# Patient Record
Sex: MALE | Race: WHITE | NOT HISPANIC OR LATINO | Employment: OTHER | URBAN - METROPOLITAN AREA
[De-identification: names, ages, dates, MRNs, and addresses within clinical notes are randomized per-mention and may not be internally consistent; named-entity substitution may affect disease eponyms.]

---

## 2017-01-17 ENCOUNTER — OFFICE VISIT (OUTPATIENT)
Dept: LAB | Facility: CLINIC | Age: 72
End: 2017-01-17
Payer: MEDICARE

## 2017-01-17 ENCOUNTER — APPOINTMENT (OUTPATIENT)
Dept: LAB | Facility: CLINIC | Age: 72
End: 2017-01-17
Payer: MEDICARE

## 2017-01-17 ENCOUNTER — TRANSCRIBE ORDERS (OUTPATIENT)
Dept: LAB | Facility: CLINIC | Age: 72
End: 2017-01-17

## 2017-01-17 DIAGNOSIS — J33.9 NASAL POLYPS: ICD-10-CM

## 2017-01-17 DIAGNOSIS — J33.9 NASAL POLYPS: Primary | ICD-10-CM

## 2017-01-17 LAB
ANION GAP SERPL CALCULATED.3IONS-SCNC: 9 MMOL/L (ref 4–13)
ATRIAL RATE: 58 BPM
ATRIAL RATE: 61 BPM
BASOPHILS # BLD AUTO: 0.03 THOUSANDS/ΜL (ref 0–0.1)
BASOPHILS NFR BLD AUTO: 0 % (ref 0–1)
BUN SERPL-MCNC: 21 MG/DL (ref 5–25)
CALCIUM SERPL-MCNC: 8.7 MG/DL (ref 8.3–10.1)
CHLORIDE SERPL-SCNC: 103 MMOL/L (ref 100–108)
CO2 SERPL-SCNC: 29 MMOL/L (ref 21–32)
CREAT SERPL-MCNC: 0.83 MG/DL (ref 0.6–1.3)
EOSINOPHIL # BLD AUTO: 0.04 THOUSAND/ΜL (ref 0–0.61)
EOSINOPHIL NFR BLD AUTO: 0 % (ref 0–6)
ERYTHROCYTE [DISTWIDTH] IN BLOOD BY AUTOMATED COUNT: 14.1 % (ref 11.6–15.1)
GFR SERPL CREATININE-BSD FRML MDRD: >60 ML/MIN/1.73SQ M
GLUCOSE SERPL-MCNC: 83 MG/DL (ref 65–140)
HCT VFR BLD AUTO: 44.9 % (ref 36.5–49.3)
HGB BLD-MCNC: 14.8 G/DL (ref 12–17)
LYMPHOCYTES # BLD AUTO: 2.78 THOUSANDS/ΜL (ref 0.6–4.47)
LYMPHOCYTES NFR BLD AUTO: 26 % (ref 14–44)
MCH RBC QN AUTO: 29.9 PG (ref 26.8–34.3)
MCHC RBC AUTO-ENTMCNC: 33 G/DL (ref 31.4–37.4)
MCV RBC AUTO: 91 FL (ref 82–98)
MONOCYTES # BLD AUTO: 1.13 THOUSAND/ΜL (ref 0.17–1.22)
MONOCYTES NFR BLD AUTO: 10 % (ref 4–12)
NEUTROPHILS # BLD AUTO: 6.88 THOUSANDS/ΜL (ref 1.85–7.62)
NEUTS SEG NFR BLD AUTO: 64 % (ref 43–75)
P AXIS: 56 DEGREES
PLATELET # BLD AUTO: 242 THOUSANDS/UL (ref 149–390)
PMV BLD AUTO: 10.2 FL (ref 8.9–12.7)
POTASSIUM SERPL-SCNC: 4.1 MMOL/L (ref 3.5–5.3)
PR INTERVAL: 198 MS
QRS AXIS: -45 DEGREES
QRS AXIS: -46 DEGREES
QRSD INTERVAL: 106 MS
QRSD INTERVAL: 94 MS
QT INTERVAL: 424 MS
QT INTERVAL: 436 MS
QTC INTERVAL: 416 MS
QTC INTERVAL: 438 MS
RBC # BLD AUTO: 4.95 MILLION/UL (ref 3.88–5.62)
SODIUM SERPL-SCNC: 141 MMOL/L (ref 136–145)
T WAVE AXIS: 48 DEGREES
T WAVE AXIS: 70 DEGREES
VENTRICULAR RATE: 58 BPM
VENTRICULAR RATE: 61 BPM
WBC # BLD AUTO: 10.86 THOUSAND/UL (ref 4.31–10.16)

## 2017-01-17 PROCEDURE — 80048 BASIC METABOLIC PNL TOTAL CA: CPT

## 2017-01-17 PROCEDURE — 85025 COMPLETE CBC W/AUTO DIFF WBC: CPT

## 2017-01-17 PROCEDURE — 36415 COLL VENOUS BLD VENIPUNCTURE: CPT

## 2017-01-17 PROCEDURE — 93005 ELECTROCARDIOGRAM TRACING: CPT

## 2018-10-09 ENCOUNTER — OFFICE VISIT (OUTPATIENT)
Dept: UROLOGY | Facility: CLINIC | Age: 73
End: 2018-10-09
Payer: MEDICARE

## 2018-10-09 VITALS
SYSTOLIC BLOOD PRESSURE: 136 MMHG | WEIGHT: 259 LBS | BODY MASS INDEX: 37.08 KG/M2 | DIASTOLIC BLOOD PRESSURE: 88 MMHG | HEIGHT: 70 IN | HEART RATE: 62 BPM

## 2018-10-09 DIAGNOSIS — N40.1 BENIGN PROSTATIC HYPERPLASIA WITH LOWER URINARY TRACT SYMPTOMS, SYMPTOM DETAILS UNSPECIFIED: Primary | ICD-10-CM

## 2018-10-09 LAB
SL AMB  POCT GLUCOSE, UA: NORMAL
SL AMB LEUKOCYTE ESTERASE,UA: NORMAL
SL AMB POCT BILIRUBIN,UA: NORMAL
SL AMB POCT BLOOD,UA: NORMAL
SL AMB POCT CLARITY,UA: CLEAR
SL AMB POCT COLOR,UA: YELLOW
SL AMB POCT KETONES,UA: NORMAL
SL AMB POCT NITRITE,UA: NORMAL
SL AMB POCT PH,UA: 6
SL AMB POCT SPECIFIC GRAVITY,UA: 1.01
SL AMB POCT URINE PROTEIN: NORMAL
SL AMB POCT UROBILINOGEN: NORMAL

## 2018-10-09 PROCEDURE — 99213 OFFICE O/P EST LOW 20 MIN: CPT | Performed by: PHYSICIAN ASSISTANT

## 2018-10-09 PROCEDURE — 81002 URINALYSIS NONAUTO W/O SCOPE: CPT | Performed by: PHYSICIAN ASSISTANT

## 2018-10-09 RX ORDER — METOPROLOL SUCCINATE 50 MG/1
TABLET, EXTENDED RELEASE ORAL
Refills: 3 | COMMUNITY
Start: 2018-09-14

## 2018-10-09 RX ORDER — WARFARIN SODIUM 7.5 MG/1
7.5 TABLET ORAL
COMMUNITY

## 2018-10-09 RX ORDER — MONTELUKAST SODIUM 10 MG/1
TABLET ORAL
Refills: 3 | COMMUNITY
Start: 2018-08-08

## 2018-10-09 NOTE — PROGRESS NOTES
UROLOGY PROGRESS NOTE   Patient Identifiers: Lia Freed (MRN [id number])  Date of Service: 10/9/2018    Subjective:      69-year-old male history of BPH and abnormal digital rectal exam   He had a prostate biopsy in 2012 which was benign  He also had a TURP in 2014 which was also benign  PSA is currently 2 0 and stable Urine is clear  He denies any lower tract complaints  Patient has  no complaints  Objective:     VITALS:    Vitals:    10/09/18 1038   BP: 136/88   Pulse: 62           LABS:  Lab Results   Component Value Date    HGB 14 8 01/17/2017    HCT 44 9 01/17/2017    WBC 10 86 (H) 01/17/2017     01/17/2017   ]    Lab Results   Component Value Date     01/17/2017    K 4 1 01/17/2017     01/17/2017    CO2 29 01/17/2017    BUN 21 01/17/2017    CREATININE 0 83 01/17/2017    CALCIUM 8 7 01/17/2017    GLUCOSE 129 12/12/2014   ]        INPATIENT MEDS:    Current Outpatient Prescriptions:     metoprolol succinate (TOPROL-XL) 50 mg 24 hr tablet, TK 1 T PO QD, Disp: , Rfl: 3    montelukast (SINGULAIR) 10 mg tablet, TK ONE T PO QPM, Disp: , Rfl: 3    warfarin (COUMADIN) 7 5 mg tablet, Take 7 5 mg by mouth, Disp: , Rfl:       Physical Exam:   /88 (Patient Position: Sitting, Cuff Size: Adult)   Pulse 62   Ht 5' 9 5" (1 765 m)   Wt 117 kg (259 lb)   BMI 37 70 kg/m²   GEN: no acute distress    RESP: breathing comfortably with no accessory muscle use    ABD: soft, non-tender, non-distended   INCISION:    EXT: no significant peripheral edema   (Male): Penis circumcised, phallus normal, meatus patent  Testicles descended into scrotum bilaterally without masses nor tenderness  No inguinal hernias bilaterally  MIKE: Prostate is enlarged at 40 grams  Abnormal on the right side and stable  The prostate is not boggy  The prostate is not tender  No nodules noted      RADIOLOGY:     None     Assessment:    1   BPH     Plan:   - follow-up in 1 year with PSA prior to visit  -  -  -

## 2019-03-11 PROBLEM — R43.8 HYPOSMIA: Status: ACTIVE | Noted: 2019-03-11

## 2019-03-11 PROBLEM — H90.3 SENSORINEURAL HEARING LOSS (SNHL), BILATERAL: Status: ACTIVE | Noted: 2019-03-11

## 2019-03-11 PROBLEM — J33.9 NASAL POLYPOSIS: Status: ACTIVE | Noted: 2019-03-11

## 2019-10-16 ENCOUNTER — OFFICE VISIT (OUTPATIENT)
Dept: UROLOGY | Facility: CLINIC | Age: 74
End: 2019-10-16
Payer: MEDICARE

## 2019-10-16 VITALS
DIASTOLIC BLOOD PRESSURE: 82 MMHG | WEIGHT: 253 LBS | SYSTOLIC BLOOD PRESSURE: 132 MMHG | HEIGHT: 72 IN | BODY MASS INDEX: 34.27 KG/M2 | HEART RATE: 85 BPM

## 2019-10-16 DIAGNOSIS — N40.1 BPH WITH OBSTRUCTION/LOWER URINARY TRACT SYMPTOMS: Primary | ICD-10-CM

## 2019-10-16 DIAGNOSIS — N13.8 BPH WITH OBSTRUCTION/LOWER URINARY TRACT SYMPTOMS: Primary | ICD-10-CM

## 2019-10-16 PROCEDURE — 99213 OFFICE O/P EST LOW 20 MIN: CPT | Performed by: PHYSICIAN ASSISTANT

## 2019-10-16 NOTE — PROGRESS NOTES
UROLOGY PROGRESS NOTE   Patient Identifiers: Gal Greenwood (MRN [id number])  Date of Service: 10/16/2019    Subjective:     67-year-old man history of BPH and abnormal MIKE  He had a prostate biopsy in 2012 which was benign and a TURP in 2014 which was also benign  His current PSA is 2 06  He has AUA index of 10 with no other complaints  Patient   Is here for follow-up of BPH    Objective:     VITALS:    Vitals:    10/16/19 1436   BP: 132/82   Pulse: 85     AUA SYMPTOM SCORE      Most Recent Value   AUA SYMPTOM SCORE   How often have you had a sensation of not emptying your bladder completely after you finished urinating? 1   How often have you had to urinate again less than two hours after you finished urinating? 2   How often have you found you stopped and started again several times when you urinate? 2   How often have you found it difficult to postpone urination? 1   How often have you had a weak urinary stream?  1   How often have you had to push or strain to begin urination? 2   How many times did you most typically get up to urinate from the time you went to bed at night until the time you got up in the morning?   1   Quality of Life: If you were to spend the rest of your life with your urinary condition just the way it is now, how would you feel about that?  2   AUA SYMPTOM SCORE  10            LABS:  Lab Results   Component Value Date    HGB 14 8 01/17/2017    HCT 44 9 01/17/2017    WBC 10 86 (H) 01/17/2017     01/17/2017   ]    Lab Results   Component Value Date     12/12/2014    K 4 1 01/17/2017     01/17/2017    CO2 29 01/17/2017    BUN 21 01/17/2017    CREATININE 0 83 01/17/2017    CALCIUM 8 7 01/17/2017    GLUCOSE 129 12/12/2014   ]        INPATIENT MEDS:    Current Outpatient Medications:     metoprolol succinate (TOPROL-XL) 50 mg 24 hr tablet, TK 1 T PO QD, Disp: , Rfl: 3    montelukast (SINGULAIR) 10 mg tablet, TK ONE T PO QPM, Disp: , Rfl: 3    warfarin (COUMADIN) 7 5 mg tablet, Take 7 5 mg by mouth, Disp: , Rfl:       Physical Exam:   /82 (BP Location: Left arm, Patient Position: Sitting, Cuff Size: Adult)   Pulse 85   Ht 6' (1 829 m)   Wt 115 kg (253 lb)   BMI 34 31 kg/m²   GEN: no acute distress    RESP: breathing comfortably with no accessory muscle use    ABD: soft, non-tender, non-distended   INCISION:    EXT: no significant peripheral edema   (Male): Penis circumcised, phallus normal, meatus patent  Testicles descended into scrotum bilaterally without masses nor tenderness  No inguinal hernias bilaterally  MIKE: Prostate is enlarged at 45 grams  The prostate is not boggy  The prostate is not tender  abnormal on the right side    RADIOLOGY:    none     Assessment:    1   BPH     Plan:   - follow-up in 1 year with PSA prior to visit  -  -  -

## 2020-03-10 PROBLEM — H69.83 ETD (EUSTACHIAN TUBE DYSFUNCTION), BILATERAL: Status: ACTIVE | Noted: 2020-03-10

## 2020-03-10 PROBLEM — H69.93 ETD (EUSTACHIAN TUBE DYSFUNCTION), BILATERAL: Status: ACTIVE | Noted: 2020-03-10

## 2020-03-10 PROBLEM — H65.93 RECURRENT SEROUS OTITIS MEDIA, BILATERAL: Status: ACTIVE | Noted: 2020-03-10

## 2021-02-09 ENCOUNTER — OFFICE VISIT (OUTPATIENT)
Dept: UROLOGY | Facility: CLINIC | Age: 76
End: 2021-02-09
Payer: MEDICARE

## 2021-02-09 VITALS
WEIGHT: 260 LBS | BODY MASS INDEX: 35.21 KG/M2 | HEART RATE: 72 BPM | HEIGHT: 72 IN | SYSTOLIC BLOOD PRESSURE: 150 MMHG | DIASTOLIC BLOOD PRESSURE: 84 MMHG

## 2021-02-09 DIAGNOSIS — N13.8 BPH WITH OBSTRUCTION/LOWER URINARY TRACT SYMPTOMS: Primary | ICD-10-CM

## 2021-02-09 DIAGNOSIS — N40.1 BPH WITH OBSTRUCTION/LOWER URINARY TRACT SYMPTOMS: Primary | ICD-10-CM

## 2021-02-09 PROCEDURE — 99213 OFFICE O/P EST LOW 20 MIN: CPT | Performed by: PHYSICIAN ASSISTANT

## 2021-02-09 NOTE — PROGRESS NOTES
UROLOGY PROGRESS NOTE   Patient Identifiers: Delia Davis (MRN [id number])  Date of Service: 2/9/2021    Subjective:     67-year-old man history of BPH and abnormal digital rectal exam   He had a prostate biopsy in 2012 which was benign and a TURP in 2014  PSA is 2 27  He has no specific outlet complaints  Reason for visit:  BPH and abnormal MIKE follow-up    Objective:     VITALS:    There were no vitals filed for this visit  LABS:  Lab Results   Component Value Date    HGB 14 8 01/17/2017    HCT 44 9 01/17/2017    WBC 10 86 (H) 01/17/2017     01/17/2017   ]    Lab Results   Component Value Date     12/12/2014    K 4 1 01/17/2017     01/17/2017    CO2 29 01/17/2017    BUN 21 01/17/2017    CREATININE 0 83 01/17/2017    CALCIUM 8 7 01/17/2017    GLUCOSE 129 12/12/2014   ]        INPATIENT MEDS:    Current Outpatient Medications:     cefdinir (OMNICEF) 300 mg capsule, TK 1 C PO Q 12 H, Disp: , Rfl:     doxycycline (ADOXA) 100 MG tablet, TK 1 T PO  D, Disp: , Rfl:     metoprolol succinate (TOPROL-XL) 50 mg 24 hr tablet, TK 1 T PO QD, Disp: , Rfl: 3    montelukast (SINGULAIR) 10 mg tablet, TK ONE T PO QPM, Disp: , Rfl: 3    warfarin (COUMADIN) 7 5 mg tablet, Take 7 5 mg by mouth, Disp: , Rfl:       Physical Exam:   There were no vitals taken for this visit  GEN: no acute distress    RESP: breathing comfortably with no accessory muscle use    ABD: soft, non-tender, non-distended   INCISION:    EXT: no significant peripheral edema   (Male): Penis circumcised, phallus normal, meatus patent  Testicles descended into scrotum bilaterally without masses nor tenderness  No inguinal hernias bilaterally  MIKE: Prostate is enlarged at 35 grams  The prostate is not boggy  The prostate is not tender  No nodules noted      RADIOLOGY:    none     Assessment:    1    BPH    Plan:   - follow-up in 1 year with PSA prior to visit  -  -  -

## 2021-05-18 ENCOUNTER — TELEPHONE (OUTPATIENT)
Dept: UROLOGY | Facility: MEDICAL CENTER | Age: 76
End: 2021-05-18

## 2021-05-18 DIAGNOSIS — N50.82 SCROTAL PAIN: Primary | ICD-10-CM

## 2021-05-18 NOTE — TELEPHONE ENCOUNTER
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

## 2021-05-18 NOTE — TELEPHONE ENCOUNTER
Patient at the [facility] office  Patient known for BPH with LUTS  Patient last office visit 2/9/2021      Patient calling in due to right testicle swelling  Patient having pain 9/10  Attempted to call patient at this time   Wife states patient is out at this time     She states she will have him call us back to triage his symptoms when he gets home     Provided her with office number

## 2021-05-19 NOTE — TELEPHONE ENCOUNTER
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

## 2021-05-19 NOTE — TELEPHONE ENCOUNTER
Patient called and advised that he still is having swelling but the pain level went down to 3 out of 10  Patient advised that he can be reached at [phone]  Please advise

## 2021-05-20 NOTE — TELEPHONE ENCOUNTER
Would recommend that patient have a ultrasound of scrotum and testicles within 1 week and have outpatient follow-up   Shortly after  Please call patient and let him know that I have sent in order for ultrasound  As well as to schedule a follow-up appointment  Will place urine testing as well     would recommend scrotal elevation  Ibuprofen and Tylenol for pain

## 2021-05-20 NOTE — TELEPHONE ENCOUNTER
Called and spoke with patient at this time  Advised providers recommend urine testing to rule out infection as well as scrotal US to be done within 1 week  Patient agreeable to plan  Number for CS provided, he will call to schedule US  Reviewed urine testing is a walk in procedure  Patient states he goes to [facility] in [place]  Advised I will fax urine testing orders to them  He will go in the next few days for this  Reviewed we would then like to see him in the office for follow up and to review [address] [facility]  Appt scheduled for 6/4/21  He knows US needs to be done before this appt  In the meantime advised supportive underwear/pants, elevation of scrotum when sitting/resting, tylenol as needed  He was thankful for call back and verbalized understanding  Urine testing orders faxed to [address] [place] [address], [phone]

## 2021-05-21 NOTE — TELEPHONE ENCOUNTER
Patient's wife called to provide a different fax number [phone]  Resent labs to that fax and confirmed

## 2021-05-23 ENCOUNTER — HOSPITAL ENCOUNTER (OUTPATIENT)
Dept: ULTRASOUND IMAGING | Facility: HOSPITAL | Age: 76
Discharge: HOME | End: 2021-05-23
Payer: MEDICARE

## 2021-05-23 DIAGNOSIS — N50.82 SCROTAL PAIN: ICD-10-CM

## 2021-05-23 PROCEDURE — 76870 US EXAM SCROTUM: CPT

## 2021-06-03 NOTE — PROGRESS NOTES
6/4/2021      Chief Complaint   Patient presents with    Benign Prostatic Hypertrophy     Assessment and Plan    1  Right testicular pain and swelling  - Resolved  - US scrotum and testicles from 5/23/21 reveals mildly complex small to moderate-sized right hydrocele and left varicocele noting 2 adjacent simple cysts measuring up to 2 4 cm   - Recommend scrotal elevation and NSAIDS for any recurrent discomfort    - No further intervention required at this time given resolution of symptoms  2  BPH  - S/p TURP in 2014  - Symptoms well managed at this time  - Return as scheduled next February for yearly follow-up     History of Present Illness  Lolis Vargas is a 77 y o  male here for evaluation of new right testicular pain and enlargement  States this resolved and currently denies any pain or swelling  US scrotum and testicles from 5/23/21 reveals mildly complex small to moderate-sized right hydrocele and left varicocele noting 2 adjacent simple cysts measuring up to 2 4 cm  Patient is a farmer and uses tractor often  Denies any urinary complaints  Review of Systems   Constitutional: Negative for chills and fever  Respiratory: Negative for shortness of breath  Cardiovascular: Negative for chest pain  Gastrointestinal: Negative for abdominal pain, constipation, diarrhea, nausea and vomiting  Genitourinary: Negative for difficulty urinating, discharge, dysuria, flank pain, frequency, genital sores, hematuria, penile pain, penile swelling, scrotal swelling, testicular pain and urgency  Skin: Negative for color change and rash  Neurological: Negative for dizziness                    Past Medical History  Past Medical History:   Diagnosis Date    Arthritis     Hypertension     Sexual dysfunction        Past Social History  Past Surgical History:   Procedure Laterality Date    HIP ARTHROPLASTY Left 2004    NASAL POLYP EXCISION      2008 & 2017    REPLACEMENT TOTAL KNEE Right     REVISION TOTAL HIP ARTHROPLASTY Left 2006    TRANSURETHRAL RESECTION OF PROSTATE  12/11/2014     Social History     Tobacco Use   Smoking Status Former Smoker   Smokeless Tobacco Never Used       Past Family History  Family History   Problem Relation Age of Onset    Diabetes Mother     Heart disease Mother     Hypertension Mother     Heart disease Father     Hypertension Father     Stroke Father        Past Social history  Social History     Socioeconomic History    Marital status: /Civil Union     Spouse name: Not on file    Number of children: Not on file    Years of education: Not on file    Highest education level: Not on file   Occupational History    Not on file   Social Needs    Financial resource strain: Not on file    Food insecurity     Worry: Not on file     Inability: Not on file   [employer] needs     Medical: Not on file     Non-medical: Not on file   Tobacco Use    Smoking status: Former Smoker    Smokeless tobacco: Never Used   Substance and Sexual Activity    Alcohol use: Yes     Frequency: Monthly or less    Drug use: No    Sexual activity: Not on file   Lifestyle    Physical activity     Days per week: Not on file     Minutes per session: Not on file    Stress: Not on file   Relationships    Social connections     Talks on phone: Not on file     Gets together: Not on file     Attends Hoahaoism service: Not on file     Active member of club or organization: Not on file     Attends meetings of clubs or organizations: Not on file     Relationship status: Not on file    Intimate partner violence     Fear of current or ex partner: Not on file     Emotionally abused: Not on file     Physically abused: Not on file     Forced sexual activity: Not on file   Other Topics Concern    Not on file   Social History Narrative    Not on file       Current Medications  Current Outpatient Medications   Medication Sig Dispense Refill    cefdinir (OMNICEF) 300 mg capsule TK 1 C PO Q 12 H      doxycycline (ADOXA) 100 MG tablet TK 1 T PO  D      metoprolol succinate (TOPROL-XL) 50 mg 24 hr tablet TK 1 T PO QD  3    montelukast (SINGULAIR) 10 mg tablet TK ONE T PO QPM  3    warfarin (COUMADIN) 7 5 mg tablet Take 7 5 mg by mouth       No current facility-administered medications for this visit  Allergies  No Known Allergies      The following portions of the patient's history were reviewed and updated as appropriate: allergies, current medications, past medical history, past social history, past surgical history and problem list       Vitals  Vitals:    06/04/21 0828   BP: 150/100   Pulse: 62   Weight: 113 kg (248 lb 9 6 oz)   Height: 6' (1 829 m)           Physical Exam  Physical Exam  Constitutional:       Appearance: Normal appearance  HENT:      Head: Normocephalic and atraumatic  Right Ear: External ear normal       Left Ear: External ear normal    Eyes:      General: No scleral icterus  Conjunctiva/sclera: Conjunctivae normal    Neck:      Musculoskeletal: Normal range of motion  Cardiovascular:      Pulses: Normal pulses  Pulmonary:      Effort: Pulmonary effort is normal    Genitourinary:     Comments: No scrotal erythema, lesions, or rashes  Right testicle slightly enlarged and consistent with hydrocele  No palpable tenderness  Musculoskeletal: Normal range of motion  Skin:     General: Skin is warm and dry  Findings: No erythema, lesion or rash  Neurological:      General: No focal deficit present  Mental Status: He is alert and oriented to person, place, and time  Psychiatric:         Mood and Affect: Mood normal          Behavior: Behavior normal          Thought Content:  Thought content normal          Judgment: Judgment normal            Results  Recent Results (from the past 1 hour(s))   POCT Measure PVR    Collection Time: 06/04/21  8:31 AM   Result Value Ref Range    POST-VOID RESIDUAL VOLUME, ML  mL   ]  No results found for: PSA  Lab Results   Component Value Date    GLUCOSE 129 12/12/2014    CALCIUM 8 7 01/17/2017     12/12/2014    K 4 1 01/17/2017    CO2 29 01/17/2017     01/17/2017    BUN 21 01/17/2017    CREATININE 0 83 01/17/2017     Lab Results   Component Value Date    WBC 10 86 (H) 01/17/2017    HGB 14 8 01/17/2017    HCT 44 9 01/17/2017    MCV 91 01/17/2017     01/17/2017           Orders  Orders Placed This Encounter   Procedures    POCT Measure PVR       Celestino Chua PA-C

## 2021-06-04 ENCOUNTER — OFFICE VISIT (OUTPATIENT)
Dept: UROLOGY | Facility: CLINIC | Age: 76
End: 2021-06-04
Payer: MEDICARE

## 2021-06-04 VITALS
SYSTOLIC BLOOD PRESSURE: 150 MMHG | HEIGHT: 72 IN | BODY MASS INDEX: 33.67 KG/M2 | HEART RATE: 62 BPM | WEIGHT: 248.6 LBS | DIASTOLIC BLOOD PRESSURE: 100 MMHG

## 2021-06-04 DIAGNOSIS — N50.819 PAIN IN TESTICLE, UNSPECIFIED LATERALITY: Primary | ICD-10-CM

## 2021-06-04 LAB — POST-VOID RESIDUAL VOLUME, ML POC: 174 ML

## 2021-06-04 PROCEDURE — 51798 US URINE CAPACITY MEASURE: CPT | Performed by: PHYSICIAN ASSISTANT

## 2021-06-04 PROCEDURE — 99213 OFFICE O/P EST LOW 20 MIN: CPT | Performed by: PHYSICIAN ASSISTANT

## 2022-02-07 ENCOUNTER — TELEPHONE (OUTPATIENT)
Dept: UROLOGY | Facility: MEDICAL CENTER | Age: 77
End: 2022-02-07

## 2022-02-07 NOTE — TELEPHONE ENCOUNTER
Spoke to Cristiane Patel   He is going to get lab work completed on 2/8/22 for his appointment on 2/10/22

## 2022-02-10 ENCOUNTER — OFFICE VISIT (OUTPATIENT)
Dept: UROLOGY | Facility: CLINIC | Age: 77
End: 2022-02-10
Payer: COMMERCIAL

## 2022-02-10 VITALS
HEIGHT: 72 IN | HEART RATE: 77 BPM | SYSTOLIC BLOOD PRESSURE: 132 MMHG | BODY MASS INDEX: 35.21 KG/M2 | DIASTOLIC BLOOD PRESSURE: 88 MMHG | WEIGHT: 260 LBS

## 2022-02-10 DIAGNOSIS — N13.8 BPH WITH OBSTRUCTION/LOWER URINARY TRACT SYMPTOMS: Primary | ICD-10-CM

## 2022-02-10 DIAGNOSIS — N40.1 BPH WITH OBSTRUCTION/LOWER URINARY TRACT SYMPTOMS: Primary | ICD-10-CM

## 2022-02-10 PROCEDURE — 99213 OFFICE O/P EST LOW 20 MIN: CPT | Performed by: PHYSICIAN ASSISTANT

## 2022-02-10 NOTE — PROGRESS NOTES
UROLOGY PROGRESS NOTE   Patient Identifiers: Mily Diego (MRN [id number])  Date of Service: 2/10/2022    Subjective:      76-year-old man history of BPH with abnormal digital rectal exam   He had a prostate biopsy in 2012 which was benign  TURP in 2014 which was also  Current PSA 1 99 peak no specific outlet complaints  Reason for visit:   BPH follow-up    Objective:     VITALS:    Vitals:    02/10/22 1022   BP: 132/88   Pulse: 77           LABS:  Lab Results   Component Value Date    HGB 14 8 01/17/2017    HCT 44 9 01/17/2017    WBC 10 86 (H) 01/17/2017     01/17/2017   ]    Lab Results   Component Value Date     12/12/2014    K 4 1 01/17/2017     01/17/2017    CO2 29 01/17/2017    BUN 21 01/17/2017    CREATININE 0 83 01/17/2017    CALCIUM 8 7 01/17/2017    GLUCOSE 129 12/12/2014   ]        INPATIENT MEDS:    Current Outpatient Medications:     metoprolol succinate (TOPROL-XL) 50 mg 24 hr tablet, TK 1 T PO QD, Disp: , Rfl: 3    warfarin (COUMADIN) 7 5 mg tablet, Take 7 5 mg by mouth, Disp: , Rfl:     cefdinir (OMNICEF) 300 mg capsule, TK 1 C PO Q 12 H (Patient not taking: Reported on 2/10/2022), Disp: , Rfl:     doxycycline (ADOXA) 100 MG tablet, TK 1 T PO  D (Patient not taking: Reported on 2/10/2022), Disp: , Rfl:     montelukast (SINGULAIR) 10 mg tablet, TK ONE T PO QPM (Patient not taking: Reported on 2/10/2022), Disp: , Rfl: 3      Physical Exam:   /88 (BP Location: Left arm, Patient Position: Sitting, Cuff Size: Adult)   Pulse 77   Ht 6' (1 829 m)   Wt 118 kg (260 lb)   BMI 35 26 kg/m²   GEN: no acute distress    RESP: breathing comfortably with no accessory muscle use    ABD: soft, non-tender, non-distended   INCISION:    EXT: no significant peripheral edema   (Male): Penis circumcised, phallus normal, meatus patent  Testicles descended into scrotum bilaterally without masses nor tenderness  No inguinal hernias bilaterally    MIKE: Prostate is enlarged at 45 grams  The prostate is not boggy  The prostate is not tender  No nodules noted  Asymmetrical right greater than left  RADIOLOGY:     None     Assessment:    1   BPH     Plan:   - follow-up in 1 year with PSA prior to visit  -  -  -

## 2022-04-14 PROBLEM — H90.A32 MIXED CONDUCTIVE AND SENSORINEURAL HEARING LOSS OF LEFT EAR WITH RESTRICTED HEARING OF RIGHT EAR: Status: ACTIVE | Noted: 2022-04-14

## 2022-04-14 PROBLEM — H90.A21 SENSORINEURAL HEARING LOSS (SNHL) OF RIGHT EAR WITH RESTRICTED HEARING OF LEFT EAR: Status: ACTIVE | Noted: 2019-03-11

## 2023-02-13 ENCOUNTER — OFFICE VISIT (OUTPATIENT)
Dept: UROLOGY | Facility: CLINIC | Age: 78
End: 2023-02-13

## 2023-02-13 ENCOUNTER — TELEPHONE (OUTPATIENT)
Dept: UROLOGY | Facility: CLINIC | Age: 78
End: 2023-02-13

## 2023-02-13 VITALS
WEIGHT: 264 LBS | HEIGHT: 72 IN | DIASTOLIC BLOOD PRESSURE: 88 MMHG | SYSTOLIC BLOOD PRESSURE: 138 MMHG | BODY MASS INDEX: 35.76 KG/M2

## 2023-02-13 DIAGNOSIS — N13.8 BPH WITH OBSTRUCTION/LOWER URINARY TRACT SYMPTOMS: Primary | ICD-10-CM

## 2023-02-13 DIAGNOSIS — N40.1 BPH WITH OBSTRUCTION/LOWER URINARY TRACT SYMPTOMS: Primary | ICD-10-CM

## 2023-02-13 RX ORDER — TAMSULOSIN HYDROCHLORIDE 0.4 MG/1
0.4 CAPSULE ORAL
Qty: 90 CAPSULE | Refills: 3 | Status: SHIPPED | OUTPATIENT
Start: 2023-02-13

## 2023-02-13 NOTE — PROGRESS NOTES
UROLOGY PROGRESS NOTE   Patient Identifiers: Marva Manley (MRN [id number])  Date of Service: 2/13/2023    Subjective:   79-year-old man history of BPH and abnormal MIKE  He had a prostate biopsy in 2012 which was benign  TURP in 2014 was also benign  PSA 2 51 and stable  He has some difficulty emptying his bladder he is seeing a neurosurgeon regarding lumbar stenosis  He has been experiencing some neurogenic claudication  He is trying to avoid back surgery  Reason for visit: BPH follow-up    Objective:     VITALS:    Vitals:    02/13/23 1413   BP: 138/88           LABS:  Lab Results   Component Value Date    HGB 14 8 01/17/2017    HCT 44 9 01/17/2017    WBC 10 86 (H) 01/17/2017     01/17/2017   ]    Lab Results   Component Value Date     12/12/2014    K 4 1 01/17/2017     01/17/2017    CO2 29 01/17/2017    BUN 21 01/17/2017    CREATININE 0 83 01/17/2017    CALCIUM 8 7 01/17/2017    GLUCOSE 129 12/12/2014   ]        INPATIENT MEDS:    Current Outpatient Medications:   •  metoprolol succinate (TOPROL-XL) 50 mg 24 hr tablet, TK 1 T PO QD, Disp: , Rfl: 3  •  montelukast (SINGULAIR) 10 mg tablet, TK ONE T PO QPM, Disp: , Rfl: 3  •  warfarin (COUMADIN) 7 5 mg tablet, Take 7 5 mg by mouth, Disp: , Rfl:       Physical Exam:   /88 (BP Location: Left arm, Patient Position: Sitting, Cuff Size: Adult)   Ht 6' (1 829 m)   Wt 120 kg (264 lb)   BMI 35 80 kg/m²   GEN: no acute distress    RESP: breathing comfortably with no accessory muscle use    ABD: soft, non-tender, non-distended   INCISION:    EXT: no significant peripheral edema   (Male): Penis circumcised, phallus normal, meatus patent  Testicles descended into scrotum bilaterally without masses nor tenderness  No inguinal hernias bilaterally  MIKE: Prostate is enlarged at 35 grams  The prostate is not boggy  The prostate is not tender  No nodules noted      RADIOLOGY:   None    Assessment:   #1    BPH    Plan:   -I offered to put him on tamsulosin-  -We will follow-up in 1 year  with PSA prior to visit  -

## 2023-07-17 NOTE — PROGRESS NOTES
PT Evaluation     Today's date: 2023  Patient name: Baldemar Lennox  : 1945  MRN: [id number]  Referring provider: Sarbjit Carballo MD  Dx:   Encounter Diagnosis     ICD-10-CM    1. Degenerative spondylolisthesis  M43.10       2. Lumbar pain  M54.50           Start Time: 1015  Stop Time: 1100  Total time in clinic (min): 45 minutes    Assessment  Assessment details: Baldemar Lennox is a 69 y.o. male who presents with pain, decreased strength, decreased ROM, decreased joint mobility and balance dysfunction. Due to these impairments, Patient has difficulty performing a/iadls, recreational activities and work-related activities. Patient has signs and symptoms consistent with their referring diagnosis of degenerative spondylolisthesis Patient would benefit from skilled physical therapy to address the impairments, improve their level of function and to improve their overall quality of life. Impairments: abnormal gait, abnormal muscle firing, abnormal or restricted ROM, abnormal movement, activity intolerance, impaired balance, impaired physical strength, lacks appropriate home exercise program, pain with function, weight-bearing intolerance and poor body mechanics    Symptom irritability: moderateUnderstanding of Dx/Px/POC: good   Prognosis: good    Goals  Short Term Goals: to be achieved by 4 weeks  1) Patient to be independent with basic HEP  2) Decrease pain to 3/10 at its worst  3) Increase lumbar spine AROM by 50% in all deficient planes   4) Increase LE strength by 1/2 MMT grade in all deficient planes    Long Term Goals: to be achieved by discharge  1) FOTO equal to or greater than projected goal.  2) Patient to be independent with comprehensive HEP  3) Lumbar spine ROM WNL all planes to improve a/iadls  4) Increase LE strength to 5/5 MMT grade in all planes to improve a/iadls  5) Increase tolerance for ambulatory activities to >30 min.     Plan  Patient would benefit from: PT eval and skilled physical therapy  Planned modality interventions: low level laser therapy  Planned therapy interventions: manual therapy, neuromuscular re-education, therapeutic activities, therapeutic exercise and home exercise program  Frequency: 2x week  Duration in visits: 12  Duration in weeks: 6  Treatment plan discussed with: patient        Subjective Evaluation    History of Present Illness  Onset date: Gradual onset within the past year. Mechanism of injury: History of Current Injury: Notes chronic low back pain which radiates down both posterior thighs to calf to feet, R>L. It is worse if standing or walking, needs to use walking stick at times, uses shopping cart. Notes legs give out at times. Notes to be walking less now than in the past due to difficulty. Injections performed L5-S1 on 4/18/23. Pt mentioned to have gone to the Ortho on 7/17 who referred patient to surgeon due to "failed conservative treatment." Notes to have an appointment in the middle of August.  Pain location/Descriptors: Lower back and glute region that radiate down the legs, pain slowly increases with increased activity, aching nature. Aggravating factors: ambulating, standing, balance, work activities: bending and lifting, working on things for long periods of time. Easing factors: Lumbar traction manual pump up  Imaging: MRI performed on 10/5/2022: "moderate DDD LL2-L5, grade I spondylolisthesis L4-5. Facet dz mild/mod L2-4 and severe L4-S1. Central stenosis moderate L2-3, L3-4 and severe L4-5. Foraminal stenosis moderate L>R L2-3, mod bilat L3-4 and L4-5 and mild bilat L5-S1.  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m    Quality of life: good    Patient Goals  Patient goals for therapy: decreased pain, increased motion, increased strength, independence with ADLs/IADLs, return to sport/leisure activities and improved balance    Pain  Current pain ratin  At best pain ratin  At worst pain ratin  Quality: dull ache and cramping  Relieving factors: change in position  Aggravating factors: walking and standing    Social Support  Steps to enter house: yes  Stairs in house: yes   Lives in: multiple-level home  Lives with: spouse      Diagnostic Tests  MRI studies: abnormal  Treatments  Previous treatment: injection treatment        Objective     Palpation   Left   No palpable tenderness to the erector spinae, gluteus conchita, gluteus medius, piriformis and proximal biceps femoris. Hypertonic in the quadratus lumborum. Tenderness of the lateral gastrocnemius and medial gastrocnemius. Trigger point to lateral gastrocnemius, lumbar paraspinals, medial gastrocnemius, proximal biceps femoris and quadratus lumborum. Right   No palpable tenderness to the erector spinae, gluteus conchita, gluteus medius, piriformis and proximal biceps femoris. Hypertonic in the quadratus lumborum. Tenderness of the lateral gastrocnemius and medial gastrocnemius. Trigger point to lateral gastrocnemius, lumbar paraspinals, medial gastrocnemius, proximal biceps femoris and quadratus lumborum.      Active Range of Motion     Lumbar   Flexion:  with pain Restriction level: moderate  Extension:  with pain  Left lateral flexion:  with pain Restriction level: minimal  Right lateral flexion:  with pain Restriction level: minimal  Left rotation:  with pain Restriction level: moderate  Right rotation:  with pain Restriction level: moderate  Left Hip   Flexion: 90 degrees with pain  Extension: 5 degrees with pain    Right Hip   Flexion: 105 degrees with pain  Extension: 10 degrees with pain  Left Knee   Flexion: 130 degrees   Extension: 0 degrees     Right Knee   Flexion: 120 degrees with pain  Extension: 0 degrees     Joint Play     Hypomobile: L1, L2, L3, L4, L5 and S1     Pain: L1, L2, L3, L4, L5 and S1   L1 comments: Noted pain down legs into feet  L2 comments: Noted pain down legs into feet  L3 comments: Noted pain down legs into feet  L4 comments: Noted pain down legs into feet  Mechanical Assessment    Cervical      Thoracic      Lumbar    Standing flexion:   Pain intensity: worse  Pain level: increased  Standing extension:   Pain intensity: worse  Pain level: increased  Lying extension:   Pain intensity: worse  Left Sidegliding:   Pain intensity: worse  Pain level: increased  Right sidegliding:   Pain intensity:worse  Pain level: increased    Strength/Myotome Testing     Left Hip   Planes of Motion   Flexion: 4+  Extension: 3+  Abduction: 3+    Right Hip   Planes of Motion   Flexion: 4-  Extension: 3+  Abduction: 3+    Tests     Lumbar     Left   Positive slump test.     Right   Positive slump test.     Ambulation     Quality of Movement During Gait   Trunk    Trunk (Left): Positive left lateral lean over stance limb. Trunk (Right): Positive lateral lean over stance limb. Pelvis    Pelvis (Left): Positive Trendelenburg. Pelvis (Right): Positive Trendelenburg.               Precautions: HTN      Manuals 7/20            STM Devin calves, ischemic compression to trigger points            Joint Mobiliztions NV            PROM NV                         Neuro Re-Ed             PPT 10x10"  HEP            Dead Bugs NV            CC shoulder extension w/ TA activation NV            Palloff Press with TA activation NV            Clams NV                                      Ther Ex             LTR NV            Hamstring stretch 3x30"  HEP, seated            Hip flexion NV            Calf stretch NV            Sciatic nerve glides Seated, 2x8 devin  HEP            Standing hip abd/flx/ext NV                                      Ther Activity             Sit to stands TRX NV                         Gait Training                                       Modalities             Lumbar Traction NV

## 2023-07-21 ENCOUNTER — EVALUATION (OUTPATIENT)
Dept: PHYSICAL THERAPY | Facility: CLINIC | Age: 78
End: 2023-07-21
Payer: COMMERCIAL

## 2023-07-21 DIAGNOSIS — M43.10 DEGENERATIVE SPONDYLOLISTHESIS: Primary | ICD-10-CM

## 2023-07-21 DIAGNOSIS — M54.50 LUMBAR PAIN: ICD-10-CM

## 2023-07-21 PROCEDURE — 97110 THERAPEUTIC EXERCISES: CPT

## 2023-07-21 PROCEDURE — 97162 PT EVAL MOD COMPLEX 30 MIN: CPT

## 2023-07-21 NOTE — PROGRESS NOTES
Daily Note     Today's date: 2023  Patient name: Marie Mascorro  : 1945  MRN: [id number]  Referring provider: Kerry Wilson MD  Dx:   Encounter Diagnosis     ICD-10-CM    1. Degenerative spondylolisthesis  M43.10       2. Lumbar pain  M54.50           Start Time: 0850  Stop Time: 0930  Total time in clinic (min): 40 minutes    Subjective: The patient presents for the first follow-up appointment, reports that symptoms stayed the same and that he was compliant most of the time with the initial HEP      Objective: See treatment diary below      Assessment: The patient tolerated manual and active treatment well today. The PT reviewed IHEP and introduced initial manual and ther-ex program during today's treatment. The patient demonstrated good response to today's treatment. Plan: Continue per plan of care.       Precautions: HTN      Manuals            STM Devin calves, ischemic compression to trigger points Devin calves, ischemic compression to trigger points, lumbar region STM           Joint Mobiliztions NV            PROM NV                         Neuro Re-Ed             PPT 10x10"  HEP 10x10"           Hooklying core sets NV 10x10"  hooklying  p ball           CC shoulder extension w/ TA activation NV            Palloff Press with TA activation NV            Clams NV 2x10, devin,  Min VC in order to maintain hip stacking and preventint posterior rotation                                     Ther Ex             LTR NV 10x10"   hooklying            Hamstring stretch 3x30"  HEP, seated DC           Hip flexion NV            Calf stretch NV            Sciatic nerve glides Seated, 2x8 devin  HEP DC           Standing hip abd/flx/ext NV            Calf raises leg press  2x8, 60lbs  Min VC in order to maintain straight knees           Leg press  3x10, 60lbs,  Min VC in order to slow descent           Ther Activity             Sit to stands TRX NV                         Gait Training Modalities             Lumbar Traction NV

## 2023-07-21 NOTE — LETTER
2023    Alexander Diaz MD  [address] [place] [id number]    Patient: Gilford Leas   YOB: 1945   Date of Visit: 2023     Encounter Diagnosis     ICD-10-CM    1. Degenerative spondylolisthesis  M43.10       2. Lumbar pain  M54.50           Dear Dr. Laurie Rosario: Thank you for your recent referral of Gilford Leas. Please review the attached evaluation summary from Vadim's recent visit. Please verify that you agree with the plan of care by signing the attached order. If you have any questions or concerns, please do not hesitate to call. I sincerely appreciate the opportunity to share in the care of one of your patients and hope to have another opportunity to work with you in the near future. Sincerely,    Maxine Hinds, PT      Referring Provider:      I certify that I have read the below Plan of Care and certify the need for these services furnished under this plan of treatment while under my care. Alexander Diaz MD  [address] [place] [id number]  Via Fax: [phone]          PT Evaluation     Today's date: 2023  Patient name: Gilford Leas  : 1945  MRN: [id number]  Referring provider: Alexander Diaz MD  Dx:   Encounter Diagnosis     ICD-10-CM    1. Degenerative spondylolisthesis  M43.10       2. Lumbar pain  M54.50           Start Time: 1015  Stop Time: 1100  Total time in clinic (min): 45 minutes    Assessment  Assessment details: Gilford Leas is a 69 y.o. male who presents with pain, decreased strength, decreased ROM, decreased joint mobility and balance dysfunction. Due to these impairments, Patient has difficulty performing a/iadls, recreational activities and work-related activities.  Patient has signs and symptoms consistent with their referring diagnosis of degenerative spondylolisthesis Patient would benefit from skilled physical therapy to address the impairments, improve their level of function and to improve their overall quality of life. Impairments: abnormal gait, abnormal muscle firing, abnormal or restricted ROM, abnormal movement, activity intolerance, impaired balance, impaired physical strength, lacks appropriate home exercise program, pain with function, weight-bearing intolerance and poor body mechanics    Symptom irritability: moderateUnderstanding of Dx/Px/POC: good   Prognosis: good    Goals  Short Term Goals: to be achieved by 4 weeks  1) Patient to be independent with basic HEP  2) Decrease pain to 3/10 at its worst  3) Increase lumbar spine AROM by 50% in all deficient planes   4) Increase LE strength by 1/2 MMT grade in all deficient planes    Long Term Goals: to be achieved by discharge  1) FOTO equal to or greater than projected goal.  2) Patient to be independent with comprehensive HEP  3) Lumbar spine ROM WNL all planes to improve a/iadls  4) Increase LE strength to 5/5 MMT grade in all planes to improve a/iadls  5) Increase tolerance for ambulatory activities to >30 min. Plan  Patient would benefit from: PT eval and skilled physical therapy  Planned modality interventions: low level laser therapy  Planned therapy interventions: manual therapy, neuromuscular re-education, therapeutic activities, therapeutic exercise and home exercise program  Frequency: 2x week  Duration in visits: 12  Duration in weeks: 6  Treatment plan discussed with: patient        Subjective Evaluation    History of Present Illness  Onset date: Gradual onset within the past year. Mechanism of injury: History of Current Injury: Notes chronic low back pain which radiates down both posterior thighs to calf to feet, R>L. It is worse if standing or walking, needs to use walking stick at times, uses shopping cart. Notes legs give out at times. Notes to be walking less now than in the past due to difficulty. Injections performed L5-S1 on 4/18/23.  Pt mentioned to have gone to the Ortho on  who referred patient to surgeon due to "failed conservative treatment." Notes to have an appointment in the middle of August.  Pain location/Descriptors: Lower back and glute region that radiate down the legs, pain slowly increases with increased activity, aching nature. Aggravating factors: ambulating, standing, balance, work activities: bending and lifting, working on things for long periods of time. Easing factors: Lumbar traction manual pump up  Imaging: MRI performed on 10/5/2022: "moderate DDD LL2-L5, grade I spondylolisthesis L4-5. Facet dz mild/mod L2-4 and severe L4-S1. Central stenosis moderate L2-3, L3-4 and severe L4-5. Foraminal stenosis moderate L>R L2-3, mod bilat L3-4 and L4-5 and mild bilat L5-S1. 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    Quality of life: good    Patient Goals  Patient goals for therapy: decreased pain, increased motion, increased strength, independence with ADLs/IADLs, return to sport/leisure activities and improved balance    Pain  Current pain ratin  At best pain ratin  At worst pain ratin  Quality: dull ache and cramping  Relieving factors: change in position  Aggravating factors: walking and standing    Social Support  Steps to enter house: yes  Stairs in house: yes   Lives in: multiple-level home  Lives with: spouse      Diagnostic Tests  MRI studies: abnormal  Treatments  Previous treatment: injection treatment        Objective     Palpation   Left   No palpable tenderness to the erector spinae, gluteus conchita, gluteus medius, piriformis and proximal biceps femoris. Hypertonic in the quadratus lumborum. Tenderness of the lateral gastrocnemius and medial gastrocnemius.    Trigger point to lateral gastrocnemius, lumbar paraspinals, medial gastrocnemius, proximal biceps femoris and quadratus lumborum. Right   No palpable tenderness to the erector spinae, gluteus conchita, gluteus medius, piriformis and proximal biceps femoris. Hypertonic in the quadratus lumborum. Tenderness of the lateral gastrocnemius and medial gastrocnemius. Trigger point to lateral gastrocnemius, lumbar paraspinals, medial gastrocnemius, proximal biceps femoris and quadratus lumborum.      Active Range of Motion     Lumbar   Flexion:  with pain Restriction level: moderate  Extension:  with pain  Left lateral flexion:  with pain Restriction level: minimal  Right lateral flexion:  with pain Restriction level: minimal  Left rotation:  with pain Restriction level: moderate  Right rotation:  with pain Restriction level: moderate  Left Hip   Flexion: 90 degrees with pain  Extension: 5 degrees with pain    Right Hip   Flexion: 105 degrees with pain  Extension: 10 degrees with pain  Left Knee   Flexion: 130 degrees   Extension: 0 degrees     Right Knee   Flexion: 120 degrees with pain  Extension: 0 degrees     Joint Play     Hypomobile: L1, L2, L3, L4, L5 and S1     Pain: L1, L2, L3, L4, L5 and S1   L1 comments: Noted pain down legs into feet  L2 comments: Noted pain down legs into feet  L3 comments: Noted pain down legs into feet  L4 comments: Noted pain down legs into feet  Mechanical Assessment    Cervical      Thoracic      Lumbar    Standing flexion:   Pain intensity: worse  Pain level: increased  Standing extension:   Pain intensity: worse  Pain level: increased  Lying extension:   Pain intensity: worse  Left Sidegliding:   Pain intensity: worse  Pain level: increased  Right sidegliding:   Pain intensity:worse  Pain level: increased    Strength/Myotome Testing     Left Hip   Planes of Motion   Flexion: 4+  Extension: 3+  Abduction: 3+    Right Hip   Planes of Motion   Flexion: 4-  Extension: 3+  Abduction: 3+    Tests     Lumbar     Left   Positive slump test.     Right   Positive slump test.     Ambulation Quality of Movement During Gait   Trunk    Trunk (Left): Positive left lateral lean over stance limb. Trunk (Right): Positive lateral lean over stance limb. Pelvis    Pelvis (Left): Positive Trendelenburg. Pelvis (Right): Positive Trendelenburg.              Precautions: HTN      Manuals 7/20            STM Devin calves, ischemic compression to trigger points            Joint Mobiliztions NV            PROM NV                         Neuro Re-Ed             PPT 10x10"  HEP            Dead Bugs NV            CC shoulder extension w/ TA activation NV            Palloff Press with TA activation NV            Clams NV                                      Ther Ex             LTR NV            Hamstring stretch 3x30"  HEP, seated            Hip flexion NV            Calf stretch NV            Sciatic nerve glides Seated, 2x8 devin  HEP            Standing hip abd/flx/ext NV                                      Ther Activity             Sit to stands TRX NV                         Gait Training                                       Modalities             Lumbar Traction NV

## 2023-07-26 ENCOUNTER — OFFICE VISIT (OUTPATIENT)
Dept: PHYSICAL THERAPY | Facility: CLINIC | Age: 78
End: 2023-07-26
Payer: COMMERCIAL

## 2023-07-26 DIAGNOSIS — M54.50 LUMBAR PAIN: ICD-10-CM

## 2023-07-26 DIAGNOSIS — M43.10 DEGENERATIVE SPONDYLOLISTHESIS: Primary | ICD-10-CM

## 2023-07-26 PROCEDURE — 97140 MANUAL THERAPY 1/> REGIONS: CPT

## 2023-07-26 PROCEDURE — 97112 NEUROMUSCULAR REEDUCATION: CPT

## 2023-07-26 PROCEDURE — 97110 THERAPEUTIC EXERCISES: CPT

## 2023-07-26 NOTE — PROGRESS NOTES
Daily Note     Today's date: 2023  Patient name: Mike Baez  : 1945  MRN: [id number]  Referring provider: Marko Henderson MD  Dx:   Encounter Diagnosis     ICD-10-CM    1. Degenerative spondylolisthesis  M43.10       2. Lumbar pain  M54.50           Start Time: 930  Stop Time: 1015  Total time in clinic (min): 45 minutes    Subjective: The patient reports no new complaints today. The patient reports some change in symptoms since previous session. Objective: See treatment diary below      Assessment: The PT continued strengthening and increasing muscle activation and recruitment during today's session. The patient's program was progressed by adding CC TA activation with shoulder extensions and marching with TA activation to promote core and hip abductor activtion. The patient tolerated manual and active treatment well today. The patient would benefit from further skilled PT services. Plan: Continue per plan of care.       Precautions: HTN      Manuals           STM Devin calves, ischemic compression to trigger points Devin calves, ischemic compression to trigger points, lumbar region STM Devin calves, ischemic compression to trigger points, lumbar region STM          Joint Mobiliztions NV            PROM NV                         Neuro Re-Ed             PPT 10x10"  HEP 10x10"           Hooklying core sets NV 10x10"  hooklying  p ball 10x10"  hooklying  p ball          CC shoulder extension w/ TA activation NV  3x10, 15lbs,  Min VC in order to maintain erect posture to prevent hip hinging          Palloff Press with TA activation NV            Clams NV 2x10, devin,  Min VC in order to maintain hip stacking and preventint posterior rotation 3x8, devin,          TA activation CC with marching   2x10, 15lbs,  Min VC and TC in order to prevent lateral trunk leaning to compensate for weak hip abductors                        Ther Ex             LTR NV 10x10"   hooklying  10x10" hooklying           Hamstring stretch 3x30"  HEP, seated DC           Hip flexion NV            Calf stretch NV            Sciatic nerve glides Seated, 2x8 moris  HEP DC           Standing hip abd/flx/ext NV            Calf raises leg press  2x8, 60lbs  Min VC in order to maintain straight knees 3x12, 60lbs          Leg press  3x10, 60lbs,  Min VC in order to slow descent 3x12, 60lbs          Ther Activity             Sit to stands TRX NV                         Gait Training                                       Modalities             Lumbar Traction NV

## 2023-07-28 ENCOUNTER — OFFICE VISIT (OUTPATIENT)
Dept: PHYSICAL THERAPY | Facility: CLINIC | Age: 78
End: 2023-07-28
Payer: COMMERCIAL

## 2023-07-28 DIAGNOSIS — M54.50 LUMBAR PAIN: ICD-10-CM

## 2023-07-28 DIAGNOSIS — M43.10 DEGENERATIVE SPONDYLOLISTHESIS: Primary | ICD-10-CM

## 2023-07-28 PROCEDURE — 97112 NEUROMUSCULAR REEDUCATION: CPT

## 2023-07-28 PROCEDURE — 97110 THERAPEUTIC EXERCISES: CPT

## 2023-07-28 PROCEDURE — 97140 MANUAL THERAPY 1/> REGIONS: CPT

## 2023-07-28 NOTE — PROGRESS NOTES
Daily Note     Today's date: 2023  Patient name: David Hurst  : 1945  MRN: [id number]  Referring provider: Tomas Garcai MD  Dx:   Encounter Diagnosis     ICD-10-CM    1. Degenerative spondylolisthesis  M43.10       2. Lumbar pain  M54.50           Start Time: 0850  Stop Time: 0930  Total time in clinic (min): 40 minutes    Subjective: The patient reports no new complaints today. The patient reports no change in symptoms since previous session. Noted to still have decreased feeling in the devin LEs. Objective: See treatment diary below      Assessment: The PT initiated hip strengthening and continued TA activation during today's session. The patient's program was progressed by adding standing hip abduction strengthening and increasing tension during clams to promote hypertrophy of the glute med and min in order to decrease contralateral leaning as a gait compensation. Held leg press and calf raise exercise due to legs feeling as though they were becoming weak due to prolonged standing. The patient tolerated manual and active treatment well today. The patient would benefit from further skilled PT services. Plan: Continue per plan of care. Precautions: HTN      Manuals          STM Devin calves, ischemic compression to trigger points Devin calves, ischemic compression to trigger points, lumbar region STM Devin calves, ischemic compression to trigger points, lumbar region STM          Joint Mobiliztions NV            PROM NV                         Neuro Re-Ed             PPT 10x10"  HEP 10x10"           Hooklying core sets NV 10x10"  hooklying  p ball 10x10"  hooklying  p ball 10x10"  hooklying  p ball         CC shoulder extension w/ TA activation NV  3x10, 15lbs,  Min VC in order to maintain erect posture to prevent hip hinging 3x12, 15lbs, Min VC in order to maintain slow controlled movements.          Palloff Press with TA activation NV            Clams NV 2x10, moris,  Min VC in order to maintain hip stacking and preventint posterior rotation 3x8, moris, 3x8, moris, Y TB, min VC in order to maintain hip stacking         TA activation CC with marching   2x10, 15lbs,  Min VC and TC in order to prevent lateral trunk leaning to compensate for weak hip abductors  2x10, 15lbs, Min VC in order to maintain upright posture                      Ther Ex             LTR NV 10x10"   hooklying  10x10"   hooklying  10x10"   hooklying          Hamstring stretch 3x30"  HEP, seated DC           Hip flexion NV            Calf stretch NV            Sciatic nerve glides Seated, 2x8 moris  HEP DC           Standing hip abd/flx/ext NV            Hip abduction    Standing, Y TB, 3x10, moris, Min VC in order to prevent contralateral leaning         Calf raises leg press  2x8, 60lbs  Min VC in order to maintain straight knees 3x12, 60lbs          Ther Activity             Sit to stands TRX NV                         Gait Training                                       Modalities             Lumbar Traction NV

## 2023-07-31 ENCOUNTER — OFFICE VISIT (OUTPATIENT)
Dept: PHYSICAL THERAPY | Facility: CLINIC | Age: 78
End: 2023-07-31
Payer: COMMERCIAL

## 2023-07-31 DIAGNOSIS — M43.10 DEGENERATIVE SPONDYLOLISTHESIS: Primary | ICD-10-CM

## 2023-07-31 DIAGNOSIS — M54.50 LUMBAR PAIN: ICD-10-CM

## 2023-07-31 PROCEDURE — 97110 THERAPEUTIC EXERCISES: CPT

## 2023-07-31 PROCEDURE — 97112 NEUROMUSCULAR REEDUCATION: CPT

## 2023-07-31 NOTE — PROGRESS NOTES
Daily Note     Today's date: 8/3/2023  Patient name: Meek Anderson  : 1945  MRN: [id number]  Referring provider: Jeannette Peabody, MD  Dx:   Encounter Diagnosis     ICD-10-CM    1. Degenerative spondylolisthesis  M43.10       2. Lumbar pain  M54.50           Start Time: 9602  Stop Time: 09  Total time in clinic (min): 43 minutes    Subjective: The patient reports no new complaints today. The patient reports some change in symptoms since previous session. Noted LEs to have bee feeling tight following working the past 2 days. Objective: See treatment diary below      Assessment: The PT continued therapeutic exercise and neuromuscular reeducation during today's session. The patient's program was progressed by adding sit to stands to promote greater LE strength for daily transfer needs without UE usage. Noted to have tolerated session better than normal. Noted legs to feel fatigued following the session thus TA activation was held for safety. The patient tolerated manual and active treatment well today. The patient would benefit from further skilled PT services. Plan: Continue per plan of care. Precautions: HTN      Manuals  8/3        STM Devin calves, ischemic compression to trigger points Devin calves, ischemic compression to trigger points, lumbar region STM Devin calves, ischemic compression to trigger points, lumbar region STM          Joint Mobiliztions NV            PROM NV                         Neuro Re-Ed             PPT 10x10"  HEP 10x10"           Hooklying core sets NV 10x10"  hooklying  p ball 10x10"  hooklying  p ball 10x10"  hooklying  p ball 10x10"  hooklying  p ball        CC shoulder extension w/ TA activation NV  3x10, 15lbs,  Min VC in order to maintain erect posture to prevent hip hinging 3x12, 15lbs, Min VC in order to maintain slow controlled movements.  3x12, 15lbs        Palloff Press with TA activation NV            Clams NV 2x10, devin,  Min VC in order to maintain hip stacking and preventint posterior rotation 3x8, moris, 3x8, moris, Y TB, min VC in order to maintain hip stacking 3x10, moris, Y TB, min VC in order to maintain hip stacking        TA activation CC with marching   2x10, 15lbs,  Min VC and TC in order to prevent lateral trunk leaning to compensate for weak hip abductors  2x10, 15lbs, Min VC in order to maintain upright posture 2x10, 15lbs                     Ther Ex             LTR NV 10x10"   hooklying  10x10"   hooklying  10x10"   hooklying  DC        Hamstring stretch 3x30"  HEP, seated DC   3x30 sec, supine, w/ green strap, moris        Hip flexion NV            Calf stretch NV    Supine w/green stretch strap, 3x30" moris        Sciatic nerve glides Seated, 2x8 moris  HEP DC           Standing hip abd/flx/ext NV            Hip abduction    Standing, Y TB, 3x10, moris, Min VC in order to prevent contralateral leaning Standing, Y TB, 3x10, moris, Min VC in order to prevent contralateral leaning        Calf raises leg press  2x8, 60lbs  Min VC in order to maintain straight knees 3x12, 60lbs          Ther Activity             Sit to stands NV    3x8, W/o UEs                     Gait Training                                       Modalities             Lumbar Traction NV

## 2023-08-03 ENCOUNTER — OFFICE VISIT (OUTPATIENT)
Dept: PHYSICAL THERAPY | Facility: CLINIC | Age: 78
End: 2023-08-03
Payer: COMMERCIAL

## 2023-08-03 DIAGNOSIS — M43.10 DEGENERATIVE SPONDYLOLISTHESIS: Primary | ICD-10-CM

## 2023-08-03 DIAGNOSIS — M54.50 LUMBAR PAIN: ICD-10-CM

## 2023-08-03 PROCEDURE — 97112 NEUROMUSCULAR REEDUCATION: CPT

## 2023-08-03 PROCEDURE — 97110 THERAPEUTIC EXERCISES: CPT

## 2023-08-07 NOTE — PROGRESS NOTES
Daily Note     Today's date: 2023  Patient name: Oleksandr To  : 1945  MRN: [id number]  Referring provider: Jair Quinones MD  Dx:   Encounter Diagnosis     ICD-10-CM    1. Lumbar pain  M54.50       2. Degenerative spondylolisthesis  M43.10           Start Time: 821  Stop Time: 930  Total time in clinic (min): 43 minutes    Subjective: The patient presents today with a report of no pain however increased feeling of unsteadiness in terms of balance and decreased ambulating endurance. The patient reports some change in symptoms since previous session. Noted surgeon appointment to be on . Objective: See treatment diary below      Assessment: The PT initiated static balance, knee flexion and extension machine, and hip extension during today's session to focus on deficits. Progressions implemented with prior exercises in order to improve LE strength. Observed pt to have anterior weight displacement with ankle correction. The patient tolerated manual and active treatment well today. The patient would benefit from further skilled PT services. Plan: Continue per plan of care. Precautions: HTN      Manuals 7/20 7/26 7/28 7/31 8/3 8/9       STM Devin calves, ischemic compression to trigger points Devin calves, ischemic compression to trigger points, lumbar region STM Devin calves, ischemic compression to trigger points, lumbar region STM          Joint Mobiliztions NV            PROM NV                         Neuro Re-Ed             PPT 10x10"  HEP 10x10"    DC       Hooklying core sets NV 10x10"  hooklying  p ball 10x10"  hooklying  p ball 10x10"  hooklying  p ball 10x10"  hooklying  p ball DC       CC shoulder extension w/ TA activation NV  3x10, 15lbs,  Min VC in order to maintain erect posture to prevent hip hinging 3x12, 15lbs, Min VC in order to maintain slow controlled movements.  3x12, 15lbs        Palloff Press with TA activation NV            Clams NV 2x10, devin,  Min VC in order to maintain hip stacking and preventint posterior rotation 3x8, moris, 3x8, moris, Y TB, min VC in order to maintain hip stacking 3x10, moris, Y TB, min VC in order to maintain hip stacking 2x8, moris, G TB, min VC in order to maintain hip stacking       TA activation CC with marching   2x10, 15lbs,  Min VC and TC in order to prevent lateral trunk leaning to compensate for weak hip abductors  2x10, 15lbs, Min VC in order to maintain upright posture 2x10, 15lbs        Static balance      3x20", green foam       Ther Ex             LTR NV 10x10"   hooklying  10x10"   hooklying  10x10"   hooklying  DC DC       Hamstring stretch 3x30"  HEP, seated DC   3x30 sec, supine, w/ green strap, moris        Hip flexion NV            Calf stretch NV    Supine w/green stretch strap, 3x30" moris Supine w/green stretch strap, 3x30" moris       Sciatic nerve glides Seated, 2x8 moris  HEP DC           Standing hip ext NV     Standing, G TB, 2x8, moris, Min VC in order to prevent contralateral leaning       Hip abduction    Standing, Y TB, 3x10, moris, Min VC in order to prevent contralateral leaning Standing, Y TB, 3x10, moris, Min VC in order to prevent contralateral leaning Standing, G TB, 2x8, moris, Min VC in order to prevent contralateral leaning       Calf raises leg press  2x8, 60lbs  Min VC in order to maintain straight knees 3x12, 60lbs          Knee flexion mchine      33lb, 25x       Knee extension machine      33lbs, 25x       Ther Activity             Sit to stands NV    3x8, W/o UEs 3x10, w/o UE usage                    Gait Training                                       Modalities             Lumbar Traction NV

## 2023-08-09 ENCOUNTER — OFFICE VISIT (OUTPATIENT)
Dept: PHYSICAL THERAPY | Facility: CLINIC | Age: 78
End: 2023-08-09
Payer: COMMERCIAL

## 2023-08-09 DIAGNOSIS — M43.10 DEGENERATIVE SPONDYLOLISTHESIS: ICD-10-CM

## 2023-08-09 DIAGNOSIS — M54.50 LUMBAR PAIN: Primary | ICD-10-CM

## 2023-08-09 PROCEDURE — 97110 THERAPEUTIC EXERCISES: CPT

## 2023-08-09 PROCEDURE — 97112 NEUROMUSCULAR REEDUCATION: CPT

## 2023-08-10 NOTE — PROGRESS NOTES
Daily Note     Today's date: 2023  Patient name: Meek Anderson  : 1945  MRN: [id number]  Referring provider: Jeannette Peabody, MD  Dx:   Encounter Diagnosis     ICD-10-CM    1. Lumbar pain  M54.50       2. Degenerative spondylolisthesis  M43.10           Start Time:   Stop Time: 1230  Total time in clinic (min): 45 minutes    Subjective: The patient reports no new complaints today. The patient reports some change in symptoms since previous session. Objective: See treatment diary below      Assessment: The PT continued manual therapy, therapeutic exercise, and neuromuscular reeducation during today's session. The patient's program was progressed by adding LAQ and marching to promote quad activation and muscular recruitment and improved hip strength to decrease compensations when flexing hip. The patient tolerated manual and active treatment well today. The patient would benefit from further skilled PT services. Plan: Continue per plan of care. Precautions: HTN    LAQ, marching  Manuals 7/20 7/26 7/28 7/31 8/3 8/9 8/11      STM Devin calves, ischemic compression to trigger points Devin calves, ischemic compression to trigger points, lumbar region STM Devin calves, ischemic compression to trigger points, lumbar region STM          Joint Mobiliztions NV            PROM NV                         Neuro Re-Ed             PPT 10x10"  HEP 10x10"    DC       Hooklying core sets NV 10x10"  hooklying  p ball 10x10"  hooklying  p ball 10x10"  hooklying  p ball 10x10"  hooklying  p ball DC       CC shoulder extension w/ TA activation NV  3x10, 15lbs,  Min VC in order to maintain erect posture to prevent hip hinging 3x12, 15lbs, Min VC in order to maintain slow controlled movements.  3x12, 15lbs        Palloff Press with TA activation NV            Clams NV 2x10, devin,  Min VC in order to maintain hip stacking and preventint posterior rotation 3x8, devin, 3x8, devin, Y TB, min VC in order to maintain hip stacking 3x10, moris, Y TB, min VC in order to maintain hip stacking 2x8, moris, G TB, min VC in order to maintain hip stacking 2x10, moris, G TB, min VC in order to maintain hip stacking      TA activation CC with marching   2x10, 15lbs,  Min VC and TC in order to prevent lateral trunk leaning to compensate for weak hip abductors  2x10, 15lbs, Min VC in order to maintain upright posture 2x10, 15lbs        LAQ       4lbs, 2x10, hold 3 sec.        Static balance      3x20", green foam       Ther Ex             LTR NV 10x10"   hooklying  10x10"   hooklying  10x10"   hooklying  DC DC       Hamstring stretch 3x30"  HEP, seated DC   3x30 sec, supine, w/ green strap, moris        Hip flexion NV            Calf stretch NV    Supine w/green stretch strap, 3x30" moris Supine w/green stretch strap, 3x30" moris Supine w/green stretch strap, 3x30" moris      Sciatic nerve glides Seated, 2x8 moris  HEP DC           Standing hip ext NV     Standing, G TB, 2x8, moris, Min VC in order to prevent contralateral leaning Standing, G TB, 2x8, moris,       Hip abduction    Standing, Y TB, 3x10, moris, Min VC in order to prevent contralateral leaning Standing, Y TB, 3x10, moris, Min VC in order to prevent contralateral leaning Standing, G TB, 2x8, moris, Min VC in order to prevent contralateral leaning Standing, G TB, 2x8, moris,       Calf raises leg press  2x8, 60lbs  Min VC in order to maintain straight knees 3x12, 60lbs          Knee flexion mchine      33lb, 25x 33lb, 25x      Seated marching       W/o arms, 4lbs, 3x10      Knee extension machine      33lbs, 25x 33lbs, 25x      Ther Activity             Sit to stands NV    3x8, W/o UEs 3x10, w/o UE usage 3x10, w/o UE usage                   Gait Training                                       Modalities             Lumbar Traction NV

## 2023-08-11 ENCOUNTER — OFFICE VISIT (OUTPATIENT)
Dept: PHYSICAL THERAPY | Facility: CLINIC | Age: 78
End: 2023-08-11
Payer: COMMERCIAL

## 2023-08-11 DIAGNOSIS — M54.50 LUMBAR PAIN: Primary | ICD-10-CM

## 2023-08-11 DIAGNOSIS — M43.10 DEGENERATIVE SPONDYLOLISTHESIS: ICD-10-CM

## 2023-08-11 PROCEDURE — 97110 THERAPEUTIC EXERCISES: CPT

## 2023-08-11 PROCEDURE — 97112 NEUROMUSCULAR REEDUCATION: CPT

## 2023-08-14 ENCOUNTER — OFFICE VISIT (OUTPATIENT)
Dept: PHYSICAL THERAPY | Facility: CLINIC | Age: 78
End: 2023-08-14
Payer: COMMERCIAL

## 2023-08-14 DIAGNOSIS — M43.10 DEGENERATIVE SPONDYLOLISTHESIS: ICD-10-CM

## 2023-08-14 DIAGNOSIS — M54.50 LUMBAR PAIN: Primary | ICD-10-CM

## 2023-08-14 PROCEDURE — 97530 THERAPEUTIC ACTIVITIES: CPT

## 2023-08-14 PROCEDURE — 97110 THERAPEUTIC EXERCISES: CPT

## 2023-08-14 NOTE — PROGRESS NOTES
Daily Note     Today's date: 2023  Patient name: Faby Garrett  : 1945  MRN: [id number]  Referring provider: Tyrell Mcclain MD  Dx:   Encounter Diagnosis     ICD-10-CM    1. Lumbar pain  M54.50       2. Degenerative spondylolisthesis  M43.10           Start Time: 8670  Stop Time: 0930  Total time in clinic (min): 43 minutes    Subjective: The patient reports no new complaints today. The patient reports some change in symptoms since previous session. Mentioned to be seeing surgeon tomorrow in order to discuss surgery date. Objective: See treatment diary below      Assessment: The PT continued therapeutic exercise, neuromuscular reeducation, and therapeutic activity during today's session. Progressions implemented in terms of dosage in order to increase strength, endurance, muscle recruitment, body mechanics, and confidence of the ability to perform stair negotiation and sit to stands. The patient tolerated manual and active treatment well today. The patient would benefit from further skilled PT services. Plan: Continue per plan of care. Precautions: HTN    LAQ, marching  Manuals  8/3 8    STM Devin calves, ischemic compression to trigger points Devin calves, ischemic compression to trigger points, lumbar region STM Devin calves, ischemic compression to trigger points, lumbar region STM          Joint Mobiliztions NV            PROM NV                         Neuro Re-Ed             PPT 10x10"  HEP 10x10"    DC       Hooklying core sets NV 10x10"  hooklying  p ball 10x10"  hooklying  p ball 10x10"  hooklying  p ball 10x10"  hooklying  p ball DC       CC shoulder extension w/ TA activation NV  3x10, 15lbs,  Min VC in order to maintain erect posture to prevent hip hinging 3x12, 15lbs, Min VC in order to maintain slow controlled movements.  3x12, 15lbs        Palloff Press with TA activation NV            Clams NV 2x10, devin,  Min VC in order to maintain hip stacking and preventint posterior rotation 3x8, moris, 3x8, moris, Y TB, min VC in order to maintain hip stacking 3x10, moris, Y TB, min VC in order to maintain hip stacking 2x8, moris, G TB, min VC in order to maintain hip stacking 2x10, moris, G TB, min VC in order to maintain hip stacking 2x10, moris, G TB, min VC in order to maintain hip stacking 2x12, moris, G TB    TA activation CC with marching   2x10, 15lbs,  Min VC and TC in order to prevent lateral trunk leaning to compensate for weak hip abductors  2x10, 15lbs, Min VC in order to maintain upright posture 2x10, 15lbs        LAQ       4lbs, 2x10, hold 3 sec.        Static balance      3x20", green foam       Ther Ex             LTR NV 10x10"   hooklying  10x10"   hooklying  10x10"   hooklying  DC DC       Hamstring stretch 3x30"  HEP, seated DC   3x30 sec, supine, w/ green strap, moris        Hip flexion NV            Calf stretch NV    Supine w/green stretch strap, 3x30" moris Supine w/green stretch strap, 3x30" moris Supine w/green stretch strap, 3x30" moris      Sciatic nerve glides Seated, 2x8 moris  HEP DC           Standing hip ext NV     Standing, G TB, 2x8, moris, Min VC in order to prevent contralateral leaning Standing, G TB, 2x8, moris,  Standing, G TB, 2x10, moris,  Standing, G TB, 2x12, moris,  Min VC in order to prevent forward leaning    Hip abduction    Standing, Y TB, 3x10, moris, Min VC in order to prevent contralateral leaning Standing, Y TB, 3x10, moris, Min VC in order to prevent contralateral leaning Standing, G TB, 2x8, moris, Min VC in order to prevent contralateral leaning Standing, G TB, 2x8, moris,  Standing, G TB, 2x10, moris,  Standing, G TB, 2x12, moris,  Min VC in order to prevent contralateral leaning    Calf raises leg press  2x8, 60lbs  Min VC in order to maintain straight knees 3x12, 60lbs          Knee flexion mchine      33lb, 25x 33lb, 25x 44lb, 25x 44lb, 25x    Seated marching       W/o arms, 4lbs, 3x10 W/o arms, 3lbs, 3x10 W/o arms, 4lbs, 3x8; Min VC in order to prevent contralateral trunk lean    Knee extension machine      33lbs, 25x 33lbs, 25x 44lbs, 25x 44lbs, 25x    Ther Activity             Sit to stands NV    3x8, W/o UEs 3x10, w/o UE usage 3x10, w/o UE usage 3x12; w/o UE usage 3x12; w/o UE usage    Step ups        2x8; moris, w/o use of railing 2x8; moris, w/o use of railing    Gait Training                                       Modalities             Lumbar Traction NV

## 2023-08-14 NOTE — PROGRESS NOTES
Daily Note     Today's date: 2023  Patient name: Marie Mascorro  : 1945  MRN: [id number]  Referring provider: Kerry Wilson MD  Dx:   Encounter Diagnosis     ICD-10-CM    1. Lumbar pain  M54.50       2. Degenerative spondylolisthesis  M43.10           Start Time: 930  Stop Time:   Total time in clinic (min): 45 minutes    Subjective: The patient reports no new complaints today. The patient reports some change in symptoms since previous session. Objective: See treatment diary below      Assessment: The PT continued therapeutic exercise, therapeutic activity, and neuromuscular reeducation during today's session. Progressed POC by increasing dosage parameters for existing exercises in order to increase strength and gluteal recruitment. The patient tolerated manual and active treatment well today. The patient would benefit from further skilled PT services. Plan: Continue per plan of care. Precautions: HTN    LAQ, marching  Manuals 7/20 7/26 7/28 7/31 8/3 8/9 8/11 8/14     STM Devin calves, ischemic compression to trigger points Devin calves, ischemic compression to trigger points, lumbar region STM Devin calves, ischemic compression to trigger points, lumbar region STM          Joint Mobiliztions NV            PROM NV                         Neuro Re-Ed             PPT 10x10"  HEP 10x10"    DC       Hooklying core sets NV 10x10"  hooklying  p ball 10x10"  hooklying  p ball 10x10"  hooklying  p ball 10x10"  hooklying  p ball DC       CC shoulder extension w/ TA activation NV  3x10, 15lbs,  Min VC in order to maintain erect posture to prevent hip hinging 3x12, 15lbs, Min VC in order to maintain slow controlled movements.  3x12, 15lbs        Palloff Press with TA activation NV            Clams NV 2x10, devin,  Min VC in order to maintain hip stacking and preventint posterior rotation 3x8, devin, 3x8, devin, Y TB, min VC in order to maintain hip stacking 3x10, devin, Y TB, min VC in order to maintain hip stacking 2x8, moris, G TB, min VC in order to maintain hip stacking 2x10, moris, G TB, min VC in order to maintain hip stacking 2x10, moris, G TB, min VC in order to maintain hip stacking     TA activation CC with marching   2x10, 15lbs,  Min VC and TC in order to prevent lateral trunk leaning to compensate for weak hip abductors  2x10, 15lbs, Min VC in order to maintain upright posture 2x10, 15lbs        LAQ       4lbs, 2x10, hold 3 sec.        Static balance      3x20", green foam       Ther Ex             LTR NV 10x10"   hooklying  10x10"   hooklying  10x10"   hooklying  DC DC       Hamstring stretch 3x30"  HEP, seated DC   3x30 sec, supine, w/ green strap, moris        Hip flexion NV            Calf stretch NV    Supine w/green stretch strap, 3x30" moris Supine w/green stretch strap, 3x30" moris Supine w/green stretch strap, 3x30" moris      Sciatic nerve glides Seated, 2x8 moris  HEP DC           Standing hip ext NV     Standing, G TB, 2x8, moris, Min VC in order to prevent contralateral leaning Standing, G TB, 2x8, moris,  Standing, G TB, 2x10, moris,      Hip abduction    Standing, Y TB, 3x10, moris, Min VC in order to prevent contralateral leaning Standing, Y TB, 3x10, moris, Min VC in order to prevent contralateral leaning Standing, G TB, 2x8, moris, Min VC in order to prevent contralateral leaning Standing, G TB, 2x8, moris,  Standing, G TB, 2x10, moris,      Calf raises leg press  2x8, 60lbs  Min VC in order to maintain straight knees 3x12, 60lbs          Knee flexion mchine      33lb, 25x 33lb, 25x 44lb, 25x     Seated marching       W/o arms, 4lbs, 3x10 W/o arms, 3lbs, 3x10     Knee extension machine      33lbs, 25x 33lbs, 25x 44lbs, 25x     Ther Activity             Sit to stands NV    3x8, W/o UEs 3x10, w/o UE usage 3x10, w/o UE usage 3x12; w/o UE usage     Step ups        2x8; moris, w/o use of railing     Gait Training                                       Modalities             Lumbar Traction NV

## 2023-08-16 ENCOUNTER — OFFICE VISIT (OUTPATIENT)
Dept: PHYSICAL THERAPY | Facility: CLINIC | Age: 78
End: 2023-08-16
Payer: COMMERCIAL

## 2023-08-16 DIAGNOSIS — M43.10 DEGENERATIVE SPONDYLOLISTHESIS: ICD-10-CM

## 2023-08-16 DIAGNOSIS — M54.50 LUMBAR PAIN: Primary | ICD-10-CM

## 2023-08-16 PROCEDURE — 97112 NEUROMUSCULAR REEDUCATION: CPT

## 2023-08-16 PROCEDURE — 97530 THERAPEUTIC ACTIVITIES: CPT

## 2023-08-16 PROCEDURE — 97110 THERAPEUTIC EXERCISES: CPT

## 2023-08-17 NOTE — PROGRESS NOTES
Daily Note     Today's date: 2023  Patient name: Dorota Toussaint  : 1945  MRN: [id number]  Referring provider: Gerry Decker MD  Dx:   Encounter Diagnosis     ICD-10-CM    1. Lumbar pain  M54.50       2. Degenerative spondylolisthesis  M43.10           Start Time: 5875  Stop Time: 1230  Total time in clinic (min): 45 minutes    Subjective: The patient reports no new complaints today. The patient reports some change in symptoms since previous session. Mentioned to have gone to the surgeon last week. Noted surgery to be scheduled on . Mentioned surgeon noted that continuing therapy until surgery was up to the pt. Noted to want to continue therapy for another week. Objective: See treatment diary below      Assessment: The PT continued therapeutic exercise, therapeutic activity, and neuromuscular reeducation during today's session. Maintained POC with progressions to dosage in order to increase strength, appropriate muscle firing and recruitment, and proper body mechanics with increased reps. The patient tolerated manual and active treatment well today. The patient would benefit from further skilled PT services. Plan: Continue per plan of care. Discharge at end of next week due to surgery.      Precautions: HTN    LAQ, marching  Manuals  8/3 8/9 8/11 8/14 8/16 8/22   STM Devin calves, ischemic compression to trigger points Devin calves, ischemic compression to trigger points, lumbar region STM Devin calves, ischemic compression to trigger points, lumbar region STM          Joint Mobiliztions NV            PROM NV                         Neuro Re-Ed             PPT 10x10"  HEP 10x10"    DC       Hooklying core sets NV 10x10"  hooklying  p ball 10x10"  hooklying  p ball 10x10"  hooklying  p ball 10x10"  hooklying  p ball DC       CC shoulder extension w/ TA activation NV  3x10, 15lbs,  Min VC in order to maintain erect posture to prevent hip hinging 3x12, 15lbs, Min VC in order to maintain slow controlled movements. 3x12, 15lbs        Palloff Press with TA activation NV            Clams NV 2x10, moris,  Min VC in order to maintain hip stacking and preventint posterior rotation 3x8, moris, 3x8, moris, Y TB, min VC in order to maintain hip stacking 3x10, moris, Y TB, min VC in order to maintain hip stacking 2x8, moris, G TB, min VC in order to maintain hip stacking 2x10, moris, G TB, min VC in order to maintain hip stacking 2x10, moris, G TB, min VC in order to maintain hip stacking 2x12, moris, G TB 3x8, moris, G TB; Min VC nd TC in order to maintain hip stacking   TA activation CC with marching   2x10, 15lbs,  Min VC and TC in order to prevent lateral trunk leaning to compensate for weak hip abductors  2x10, 15lbs, Min VC in order to maintain upright posture 2x10, 15lbs        LAQ       4lbs, 2x10, hold 3 sec.        Static balance      3x20", green foam       Ther Ex             LTR NV 10x10"   hooklying  10x10"   hooklying  10x10"   hooklying  DC DC       Hamstring stretch 3x30"  HEP, seated DC   3x30 sec, supine, w/ green strap, moris        Hip flexion NV            Calf stretch NV    Supine w/green stretch strap, 3x30" moris Supine w/green stretch strap, 3x30" moris Supine w/green stretch strap, 3x30" moris      Sciatic nerve glides Seated, 2x8 moris  HEP DC           Standing hip ext NV     Standing, G TB, 2x8, moris, Min VC in order to prevent contralateral leaning Standing, G TB, 2x8, moris,  Standing, G TB, 2x10, moris,  Standing, G TB, 2x12, moris,  Min VC in order to prevent forward leaning Standing, G TB, 3x8, moris,  Min VC in order to prevent forward leaning   Hip abduction    Standing, Y TB, 3x10, moris, Min VC in order to prevent contralateral leaning Standing, Y TB, 3x10, moris, Min VC in order to prevent contralateral leaning Standing, G TB, 2x8, moris, Min VC in order to prevent contralateral leaning Standing, G TB, 2x8, moris,  Standing, G TB, 2x10, moris,  Standing, G TB, 2x12, moris,  Min VC in order to prevent contralateral leaning Standing, G TB, 3x8, moris,  Min VC in order to prevent contralateral leaning   Calf raises leg press  2x8, 60lbs  Min VC in order to maintain straight knees 3x12, 60lbs          Knee flexion mchine      33lb, 25x 33lb, 25x 44lb, 25x 44lb, 25x 44lb, 30x   Seated marching       W/o arms, 4lbs, 3x10 W/o arms, 3lbs, 3x10 W/o arms, 4lbs, 3x8; Min VC in order to prevent contralateral trunk lean W/o arms, 4lbs, 3x10; Min VC in order to prevent contralateral   Knee extension machine      33lbs, 25x 33lbs, 25x 44lbs, 25x 44lbs, 25x 44lbs, 30x   Ther Activity             Sit to stands NV    3x8, W/o UEs 3x10, w/o UE usage 3x10, w/o UE usage 3x12; w/o UE usage 3x12; w/o UE usage 3x12; w/o UE usage   Step ups        2x8; moris, w/o use of railing 2x8; moris, w/o use of railing 2x8; moris, w/o use of railing   Gait Training                                       Modalities             Lumbar Traction NV

## 2023-08-22 ENCOUNTER — OFFICE VISIT (OUTPATIENT)
Dept: PHYSICAL THERAPY | Facility: CLINIC | Age: 78
End: 2023-08-22
Payer: COMMERCIAL

## 2023-08-22 DIAGNOSIS — M54.50 LUMBAR PAIN: Primary | ICD-10-CM

## 2023-08-22 DIAGNOSIS — M43.10 DEGENERATIVE SPONDYLOLISTHESIS: ICD-10-CM

## 2023-08-22 PROCEDURE — 97110 THERAPEUTIC EXERCISES: CPT

## 2023-08-22 PROCEDURE — 97530 THERAPEUTIC ACTIVITIES: CPT

## 2023-08-22 PROCEDURE — 97112 NEUROMUSCULAR REEDUCATION: CPT

## 2023-08-23 ENCOUNTER — APPOINTMENT (OUTPATIENT)
Dept: PHYSICAL THERAPY | Facility: CLINIC | Age: 78
End: 2023-08-23
Payer: COMMERCIAL

## 2023-08-24 NOTE — PROGRESS NOTES
Daily Note     Today's date: 2023  Patient name: Chelsie Hwang  : 1945  MRN: [id number]  Referring provider: Hannah Jarrett MD  Dx:   Encounter Diagnosis     ICD-10-CM    1. Lumbar pain  M54.50       2. Degenerative spondylolisthesis  M43.10           Start Time: 930  Stop Time:   Total time in clinic (min): 45 minutes    Subjective: The patient reports no new complaints today. The patient reports some change in symptoms since previous session. Objective: See treatment diary below      Assessment: The PT continued therapeutic exercise, therapeutic activity, and neuromuscular reeducation during today's session. Maintained POC with dosage changes in order to promote greater strength and muscle activation. Noted decreased trunk leaning and LOB with TA activation with marching and step ups. The patient tolerated manual and active treatment well today. The patient would benefit from further skilled PT services. Plan: Continue per plan of care. Precautions: HTN    LAQ, marching  Manuals   8/3 8   STM  Devin calves, ischemic compression to trigger points, lumbar region STM Devin calves, ischemic compression to trigger points, lumbar region STM          Joint Mobiliztions             PROM                          Neuro Re-Ed             PPT  10x10"    DC       Hooklying core sets  10x10"  hooklying  p ball 10x10"  hooklying  p ball 10x10"  hooklying  p ball 10x10"  hooklying  p ball DC       CC shoulder extension w/ TA activation CC, 3x10, marching, 15lbs, Min VC in order to maintain high knees  3x10, 15lbs,  Min VC in order to maintain erect posture to prevent hip hinging 3x12, 15lbs, Min VC in order to maintain slow controlled movements.  3x12, 15lbs        Palloff Press with TA activation             Clams 3x8, devin, G TB; Min VC and TC in order to maintain hip stacking 2x10, devin,  Min VC in order to maintain hip stacking and preventint posterior rotation 3x8, moris, 3x8, moris, Y TB, min VC in order to maintain hip stacking 3x10, moris, Y TB, min VC in order to maintain hip stacking 2x8, moris, G TB, min VC in order to maintain hip stacking 2x10, moris, G TB, min VC in order to maintain hip stacking 2x10, moris, G TB, min VC in order to maintain hip stacking 2x12, moris, G TB 3x8, moris, G TB; Min VC and TC in order to maintain hip stacking   TA activation CC with marching   2x10, 15lbs,  Min VC and TC in order to prevent lateral trunk leaning to compensate for weak hip abductors  2x10, 15lbs, Min VC in order to maintain upright posture 2x10, 15lbs        LAQ       4lbs, 2x10, hold 3 sec.        Static balance      3x20", green foam       Ther Ex             LTR  10x10"   hooklying  10x10"   hooklying  10x10"   hooklying  DC DC       Hamstring stretch  DC   3x30 sec, supine, w/ green strap, moris        Hip flexion             Calf stretch     Supine w/green stretch strap, 3x30" moris Supine w/green stretch strap, 3x30" moris Supine w/green stretch strap, 3x30" moris      Sciatic nerve glides  DC           Standing hip ext Standing, G TB, 3x8, moris,  Min VC in order to prevent forward leaning     Standing, G TB, 2x8, moris, Min VC in order to prevent contralateral leaning Standing, G TB, 2x8, moris,  Standing, G TB, 2x10, moris,  Standing, G TB, 2x12, moris,  Min VC in order to prevent forward leaning Standing, G TB, 3x8, moris,  Min VC in order to prevent forward leaning   Hip abduction Standing, G TB, 3x8, moris,  Min VC in order to prevent contralateral leaning   Standing, Y TB, 3x10, moris, Min VC in order to prevent contralateral leaning Standing, Y TB, 3x10, moris, Min VC in order to prevent contralateral leaning Standing, G TB, 2x8, moris, Min VC in order to prevent contralateral leaning Standing, G TB, 2x8, moris,  Standing, G TB, 2x10, moirs,  Standing, G TB, 2x12, moris,  Min VC in order to prevent contralateral leaning Standing, G TB, 3x8, moris,  Min VC in order to prevent contralateral leaning   Calf raises leg press  2x8, 60lbs  Min VC in order to maintain straight knees 3x12, 60lbs          Knee flexion machine 44lb, 30x     33lb, 25x 33lb, 25x 44lb, 25x 44lb, 25x 44lb, 30x   Seated marching W/o arms, 5lbs, 3x10; Min VC in order to cross arms over chest to prevent use of arm       W/o arms, 4lbs, 3x10 W/o arms, 3lbs, 3x10 W/o arms, 4lbs, 3x8; Min VC in order to prevent contralateral trunk lean W/o arms, 4lbs, 3x10; Min VC in order to prevent contralateral   Knee extension machine 44lbs, 30x     33lbs, 25x 33lbs, 25x 44lbs, 25x 44lbs, 25x 44lbs, 30x   Ther Activity             Sit to stands 3x12; w/o UE usage    3x8, W/o UEs 3x10, w/o UE usage 3x10, w/o UE usage 3x12; w/o UE usage 3x12; w/o UE usage 3x12; w/o UE usage   Step ups 2x10; moris, w/o use of railing       2x8; moris, w/o use of railing 2x8; moris, w/o use of railing 2x8; moris, w/o use of railing   Gait Training                                       Modalities             Lumbar Traction

## 2023-08-28 ENCOUNTER — OFFICE VISIT (OUTPATIENT)
Dept: PHYSICAL THERAPY | Facility: CLINIC | Age: 78
End: 2023-08-28
Payer: COMMERCIAL

## 2023-08-28 DIAGNOSIS — M43.10 DEGENERATIVE SPONDYLOLISTHESIS: ICD-10-CM

## 2023-08-28 DIAGNOSIS — M54.50 LUMBAR PAIN: Primary | ICD-10-CM

## 2023-08-28 PROCEDURE — 97112 NEUROMUSCULAR REEDUCATION: CPT

## 2023-08-28 PROCEDURE — 97530 THERAPEUTIC ACTIVITIES: CPT

## 2023-08-28 PROCEDURE — 97110 THERAPEUTIC EXERCISES: CPT

## 2023-08-28 NOTE — PROGRESS NOTES
PT- Discharge Summary    Today's date: 2023  Patient name: Anand Marshall  : 1945  MRN: [id number]  Referring provider: Shira Camarena MD  Dx:   Encounter Diagnosis     ICD-10-CM    1. Lumbar pain  M54.50       2. Degenerative spondylolisthesis  M43.10           Start Time: 1100  Stop Time: 1145  Total time in clinic (min): 45 minutes    Subjective: Pt mentioned to be feeling approximately 0% better. Noted no improvements with therapy. Noted surgery date to be . Pain level currently 0/10 upon arrival. Pt noted that most of discomfort and pain comes from legs and feet rather than the back. Objective: See treatment diary below      Assessment: Noted improvements in FOTO score, R hip flexion ROM, pain with positional movements, tenderness with palpation, and overall LE strength. Deficits include pain levels, moris hip ROM, and LE strength. These deficits prevent the pt from feeling confident while ambulating in addition to decreased sensation in feet. Educated pt on the importance of continuing HEP. Discharging due to upcoming surgery and pt needing time to complete all preoperative testing. The PT continued therapeutic exercise during today's session. The patient tolerated manual and active treatment well today. Plan: Discharge due to upcoming surgery and at pt's request due to several upcoming preoperative appointments.      Goals  Short Term Goals: to be achieved by 4 weeks  1) Patient to be independent with basic HEP- MET  2) Decrease pain to 3/10 at its worst- unmet  3) Increase lumbar spine AROM by 50% in all deficient planes - MET  4) Increase LE strength by 1/2 MMT grade in all deficient planes- MET    Long Term Goals: to be achieved by discharge  1) FOTO equal to or greater than projected goal.- MET  2) Patient to be independent with comprehensive HEP- MET  3) Lumbar spine ROM WNL all planes to improve a/iadls- partially met  4) Increase LE strength to 5/5 MMT grade in all planes to improve a/iadls- partially met  5) Increase tolerance for ambulatory activities to >30 min. - unmet     Subjective Evaluation     History of Present Illness  Onset date: Gradual onset within the past year. Mechanism of injury: History of Current Injury: Notes chronic low back pain which radiates down both posterior thighs to calf to feet, R>L. It is worse if standing or walking, needs to use walking stick at times, uses shopping cart. Notes legs give out at times. Notes to be walking less now than in the past due to difficulty. Injections performed L5-S1 on 23. Pt mentioned to have gone to the Ortho on  who referred patient to surgeon due to "failed conservative treatment." Notes to have an appointment in the middle of August.  Pain location/Descriptors: Lower back and glute region that radiate down the legs, pain slowly increases with increased activity, aching nature. Aggravating factors: ambulating, standing, balance, work activities: bending and lifting, working on things for long periods of time. Easing factors: Lumbar traction manual pump up  Imaging: MRI performed on 10/5/2022: "moderate DDD LL2-L5, grade I spondylolisthesis L4-5. Facet dz mild/mod L2-4 and severe L4-S1. Central stenosis moderate L2-3, L3-4 and severe L4-5. Foraminal stenosis moderate L>R L2-3, mod bilat L3-4 and L4-5 and mild bilat L5-S1.  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m    Quality of life: good    Patient Goals  Patient goals for therapy: decreased pain, increased motion, increased strength, independence with ADLs/IADLs, return to sport/leisure activities and improved balance     Pain  Current pain ratin  At best pain ratin  At worst pain ratin     Objective      Palpation   Left and Right  No tenderness noted however LE posterior leg nerve symptoms with palpation to the piriformis and glute med     Active Range of Motion      Lumbar   Flexion:  Restriction level: minimal  Extension:  Restriction level: minimal   Left lateral flexion:  Restriction level: minimal  Right lateral flexion:  Restriction level: minimal  Left rotation:   Restriction level: minimal  Right rotation:  Restriction level: minimal     Left Hip   Flexion: 95 degrees with pain  Extension: 5 degrees with pain     Right Hip   Flexion: 110 degrees with pain  Extension: 10 degrees with pain    Left Knee   Flexion: 130 degrees   Extension: 0 degrees      Right Knee   Flexion: 120 degrees with pain  Extension: 0 degrees      Joint Play     Hypomobile: L1, L2, L3, L4, L5 and S1   L1-L5: no noted pain down legs     Strength/Myotome Testing      Left Hip   Planes of Motion   Flexion: 4+  Extension: 4  Abduction: 4     Right Hip   Planes of Motion   Flexion: 4+  Extension: 4  Abduction: 4     Tests      Lumbar      Left   Negative slump test.      Right   Positive slump test.      Precautions: HTN    LAQ, marching  Manuals 9/1 7/26 7/28 7/31 8/3 8/9 8/11 8/14 8/16 8/22   STM  Devin calves, ischemic compression to trigger points, lumbar region STM Devin calves, ischemic compression to trigger points, lumbar region STM          Joint Mobiliztions             PROM             Measurements ROM, strength, joint mobility, palpation            Neuro Re-Ed             PPT  10x10"    DC       Hooklying core sets  10x10"  hooklying  p ball 10x10"  hooklying  p ball 10x10"  hooklying  p ball 10x10"  hooklying  p ball DC       CC shoulder extension w/ TA activation   3x10, 15lbs,  Min VC in order to maintain erect posture to prevent hip hinging 3x12, 15lbs, Min VC in order to maintain slow controlled movements.  3x12, 15lbs        Palloff Press with TA activation             Clams  2x10, devin,  Min VC in order to maintain hip stacking and preventint posterior rotation 3x8, devin, 3x8, devin, Y TB, min VC in order to maintain hip stacking 3x10, moris, Y TB, min VC in order to maintain hip stacking 2x8, moris, G TB, min VC in order to maintain hip stacking 2x10, moris, G TB, min VC in order to maintain hip stacking 2x10, moris, G TB, min VC in order to maintain hip stacking 2x12, moris, G TB 3x8, moris, G TB; Min VC and TC in order to maintain hip stacking   TA activation CC with marching   2x10, 15lbs,  Min VC and TC in order to prevent lateral trunk leaning to compensate for weak hip abductors  2x10, 15lbs, Min VC in order to maintain upright posture 2x10, 15lbs        LAQ       4lbs, 2x10, hold 3 sec.        Static balance      3x20", green foam       Ther Ex             LTR  10x10"   hooklying  10x10"   hooklying  10x10"   hooklying  DC DC       Hamstring stretch  DC   3x30 sec, supine, w/ green strap, moris        Hip flexion             Calf stretch     Supine w/green stretch strap, 3x30" moris Supine w/green stretch strap, 3x30" moris Supine w/green stretch strap, 3x30" moris      Sciatic nerve glides  DC           Standing hip ext Standing, G TB, 3x8, moris,  Min VC in order to prevent forward leaning     Standing, G TB, 2x8, moris, Min VC in order to prevent contralateral leaning Standing, G TB, 2x8, moris,  Standing, G TB, 2x10, moris,  Standing, G TB, 2x12, moris,  Min VC in order to prevent forward leaning Standing, G TB, 3x8, moris,  Min VC in order to prevent forward leaning   Hip abduction Standing, G TB, 3x8, moris,  Min VC in order to prevent contralateral leaning   Standing, Y TB, 3x10, moris, Min VC in order to prevent contralateral leaning Standing, Y TB, 3x10, moris, Min VC in order to prevent contralateral leaning Standing, G TB, 2x8, moris, Min VC in order to prevent contralateral leaning Standing, G TB, 2x8, moris,  Standing, G TB, 2x10, moris,  Standing, G TB, 2x12, moris,  Min VC in order to prevent contralateral leaning Standing, G TB, 3x8, moris,  Min VC in order to prevent contralateral leaning   Calf raises leg press  2x8, 60lbs  Min VC in order to maintain straight knees 3x12, 60lbs          Knee flexion machine 44lb, 30x     33lb, 25x 33lb, 25x 44lb, 25x 44lb, 25x 44lb, 30x   Seated marching W/o arms, 5lbs, 3x10; Min VC in order to prevent trunk lean      W/o arms, 4lbs, 3x10 W/o arms, 3lbs, 3x10 W/o arms, 4lbs, 3x8; Min VC in order to prevent contralateral trunk lean W/o arms, 4lbs, 3x10; Min VC in order to prevent contralateral   Knee extension machine 44lbs, 30x     33lbs, 25x 33lbs, 25x 44lbs, 25x 44lbs, 25x 44lbs, 30x   Ther Activity             Sit to stands DC    3x8, W/o UEs 3x10, w/o UE usage 3x10, w/o UE usage 3x12; w/o UE usage 3x12; w/o UE usage 3x12; w/o UE usage   Step ups DC       2x8; moris, w/o use of railing 2x8; moris, w/o use of railing 2x8; moris, w/o use of railing   Gait Training                                       Modalities             Lumbar Traction

## 2023-09-01 ENCOUNTER — OFFICE VISIT (OUTPATIENT)
Dept: PHYSICAL THERAPY | Facility: CLINIC | Age: 78
End: 2023-09-01
Payer: COMMERCIAL

## 2023-09-01 DIAGNOSIS — M54.50 LUMBAR PAIN: Primary | ICD-10-CM

## 2023-09-01 DIAGNOSIS — M43.10 DEGENERATIVE SPONDYLOLISTHESIS: ICD-10-CM

## 2023-09-01 PROCEDURE — 97140 MANUAL THERAPY 1/> REGIONS: CPT

## 2023-09-01 PROCEDURE — 97110 THERAPEUTIC EXERCISES: CPT

## 2023-09-06 ENCOUNTER — HOSPITAL ENCOUNTER (OUTPATIENT)
Dept: NON INVASIVE DIAGNOSTICS | Facility: CLINIC | Age: 78
Discharge: HOME | End: 2023-09-06
Payer: COMMERCIAL

## 2023-09-06 VITALS
BODY MASS INDEX: 35.21 KG/M2 | WEIGHT: 260 LBS | SYSTOLIC BLOOD PRESSURE: 138 MMHG | HEART RATE: 55 BPM | HEIGHT: 72 IN | DIASTOLIC BLOOD PRESSURE: 88 MMHG

## 2023-09-06 DIAGNOSIS — Z01.818 OTHER SPECIFIED PRE-OPERATIVE EXAMINATION: ICD-10-CM

## 2023-09-06 LAB
AORTIC ROOT: 4.4 CM
APICAL FOUR CHAMBER EJECTION FRACTION: 49 %
ASCENDING AORTA: 4.5 CM
E WAVE DECELERATION TIME: 287 MS
FRACTIONAL SHORTENING: 35 (ref 28–44)
INTERVENTRICULAR SEPTUM IN DIASTOLE (PARASTERNAL SHORT AXIS VIEW): 1.1 CM
INTERVENTRICULAR SEPTUM: 1.1 CM (ref 0.6–1.1)
LAAS-AP2: 24.5 CM2
LAAS-AP4: 17.9 CM2
LEFT ATRIUM AREA SYSTOLE SINGLE PLANE A4C: 19 CM2
LEFT ATRIUM SIZE: 4 CM
LEFT ATRIUM VOLUME (MOD BIPLANE): 71 ML
LEFT INTERNAL DIMENSION IN SYSTOLE: 3.2 CM (ref 2.1–4)
LEFT VENTRICLE DIASTOLIC VOLUME (MOD BIPLANE): 141 ML
LEFT VENTRICLE SYSTOLIC VOLUME (MOD BIPLANE): 75 ML
LEFT VENTRICULAR INTERNAL DIMENSION IN DIASTOLE: 4.9 CM (ref 3.5–6)
LEFT VENTRICULAR POSTERIOR WALL IN END DIASTOLE: 1.1 CM
LEFT VENTRICULAR STROKE VOLUME: 71 ML
LV EF: 47 %
LVSV (TEICH): 71 ML
MV E'TISSUE VEL-SEP: 9 CM/S
MV PEAK A VEL: 0.87 M/S
MV PEAK E VEL: 79 CM/S
MV STENOSIS PRESSURE HALF TIME: 83 MS
MV VALVE AREA P 1/2 METHOD: 2.65
RIGHT ATRIUM AREA SYSTOLE A4C: 15.2 CM2
RIGHT VENTRICLE ID DIMENSION: 3.5 CM
SL CV LEFT ATRIUM LENGTH A2C: 5.2 CM
SL CV LV EF: 65
SL CV PED ECHO LEFT VENTRICLE DIASTOLIC VOLUME (MOD BIPLANE) 2D: 111 ML
SL CV PED ECHO LEFT VENTRICLE SYSTOLIC VOLUME (MOD BIPLANE) 2D: 40 ML
TRICUSPID ANNULAR PLANE SYSTOLIC EXCURSION: 2.8 CM

## 2023-09-06 PROCEDURE — 93306 TTE W/DOPPLER COMPLETE: CPT

## 2023-09-06 PROCEDURE — 93306 TTE W/DOPPLER COMPLETE: CPT | Performed by: INTERNAL MEDICINE

## 2024-01-02 PROBLEM — E66.01 OBESITY, MORBID (HCC): Status: ACTIVE | Noted: 2024-01-02

## 2024-09-23 NOTE — PROGRESS NOTES
9/24/2024      No chief complaint on file.        Assessment and Plan    78 y.o. male     BPH with lower urinary tract symptoms  -Initiate tamsulosin 0.4 mg. Side effect profile discussed    2. Prostate Cancer Screening  -Previous PSA 2.51 (2/2023). He will repeat his PSA now.  -MIKE prostate is smooth, nontender. No nodules or asymmetry appreciated. About 35 g.     3. Erectile Dysfunction  -Initiate 5 mg Cialis on-demand. Side effect profile discussed    -Patient will return in 3 months for follow-up.      History of Present Illness  Vadim Madrigal is a 78 y.o. male here with history of BPH and abnormal MIKE.  He had a prostate biopsy in 2012 which was benign. He also has hx of TURP in 2014 was also benign. He does not have a recent PSA. He denies family hx of prostate ca.  He is seeing a neurosurgeon regarding lumbar stenosis.    He states that he does have a weaker stream at times as well as urinary urgency and frequency. He is interested in trying flomax.  He also states that he has not been getting erections. He has not previously tried any pharmacologic interventions.       Review of Systems   Constitutional:  Negative for activity change, fatigue and fever.   HENT:  Negative for congestion, rhinorrhea and sore throat.    Eyes:  Negative for photophobia, redness and visual disturbance.   Respiratory:  Negative for cough, shortness of breath and wheezing.    Cardiovascular:  Negative for chest pain, palpitations and leg swelling.   Gastrointestinal:  Negative for abdominal pain, diarrhea, nausea and vomiting.   Genitourinary:  Positive for frequency and urgency. Negative for dysuria, flank pain and hematuria.   Neurological:  Negative for weakness, light-headedness and headaches.           AUA SYMPTOM SCORE      Flowsheet Row Most Recent Value   AUA SYMPTOM SCORE    How often have you had a sensation of not emptying your bladder completely after you finished urinating? 5 (P)     How often have you had to  urinate again less than two hours after you finished urinating? 2 (P)     How often have you found you stopped and started again several times when you urinate? 1 (P)     How often have you found it difficult to postpone urination? 3 (P)     How often have you had a weak urinary stream? 3 (P)     How often have you had to push or strain to begin urination? 2 (P)     How many times did you most typically get up to urinate from the time you went to bed at night until the time you got up in the morning? 3 (P)     Quality of Life: If you were to spend the rest of your life with your urinary condition just the way it is now, how would you feel about that? 3 (P)     AUA SYMPTOM SCORE 19 (P)               Vitals  There were no vitals filed for this visit.    Physical Exam  Constitutional:       Appearance: Normal appearance. He is not toxic-appearing.   HENT:      Head: Normocephalic.      Mouth/Throat:      Pharynx: Oropharynx is clear.   Eyes:      Extraocular Movements: Extraocular movements intact.      Pupils: Pupils are equal, round, and reactive to light.   Pulmonary:      Effort: Pulmonary effort is normal. No respiratory distress.   Genitourinary:     Comments: MIKE prostate is smooth, nontender. No nodules or asymmetry appreciated. About 35 g.  Musculoskeletal:         General: Normal range of motion.      Cervical back: Normal range of motion.   Neurological:      Mental Status: He is alert and oriented to person, place, and time. Mental status is at baseline.      Gait: Gait normal.   Psychiatric:         Mood and Affect: Mood normal.         Behavior: Behavior normal.         Thought Content: Thought content normal.         Judgment: Judgment normal.           Past History  Past Medical History:   Diagnosis Date    Arthritis     Hypertension     Sexual dysfunction      Social History     Socioeconomic History    Marital status: /Civil Union     Spouse name: Not on file    Number of children: Not on file     Years of education: Not on file    Highest education level: Not on file   Occupational History    Not on file   Tobacco Use    Smoking status: Former    Smokeless tobacco: Never   Vaping Use    Vaping status: Never Used   Substance and Sexual Activity    Alcohol use: Yes     Comment: social    Drug use: No    Sexual activity: Not on file   Other Topics Concern    Not on file   Social History Narrative    Not on file     Social Determinants of Health     Financial Resource Strain: Low Risk  (10/9/2023)    [facility]    Overall Financial Resource Strain (CARDIA)     Difficulty of Paying Living Expenses: Not very hard   Food Insecurity: No Food Insecurity (10/9/2023)    [facility]    Hunger Vital Sign     Worried About Running Out of Food in the Last Year: Never true     Ran Out of Food in the Last Year: Never true   Transportation Needs: No Transportation Needs (10/9/2023)    [facility]    PRAPARE - Transportation     Lack of Transportation (Medical): No     Lack of Transportation (Non-Medical): No   Physical Activity: Not on file   Stress: Not on file   Social Connections: Not on file   Intimate Partner Violence: Not At Risk (10/9/2023)    [facility]    Humiliation, Afraid, Rape, and Kick questionnaire     Fear of Current or Ex-Partner: No     Emotionally Abused: No     Physically Abused: No     Sexually Abused: No   Housing Stability: Low Risk  (10/9/2023)    [facility]    Housing Stability Vital Sign     Unable to Pay for Housing in the Last Year: No     Number of Places Lived in the Last Year: 1     Unstable Housing in the Last Year: No     Social History     Tobacco Use   Smoking Status Former   Smokeless  "Tobacco Never     Family History   Problem Relation Age of Onset    Diabetes Mother     Heart disease Mother     Hypertension Mother     Heart disease Father     Hypertension Father     Stroke Father        The following portions of the patient's history were reviewed and updated as appropriate: allergies, current medications, past medical history, past social history, past surgical history and problem list.    Results  No results found for this or any previous visit (from the past 1 hour(s)).]  No results found for: \"PSA\"  Lab Results   Component Value Date    GLUCOSE 129 12/12/2014    CALCIUM 9.2 06/18/2024     12/12/2014    K 4.5 06/18/2024    CO2 27 06/18/2024     06/18/2024    BUN 16 06/18/2024    CREATININE 0.84 06/18/2024     Lab Results   Component Value Date    WBC 10.86 (H) 01/17/2017    HGB 13.4 10/06/2020    HCT 40.1 10/06/2020    MCV 91 01/17/2017     01/17/2017       RADHA Churchill  "

## 2024-09-24 ENCOUNTER — OFFICE VISIT (OUTPATIENT)
Dept: UROLOGY | Facility: CLINIC | Age: 79
End: 2024-09-24
Payer: COMMERCIAL

## 2024-09-24 VITALS
WEIGHT: 256 LBS | DIASTOLIC BLOOD PRESSURE: 90 MMHG | HEART RATE: 63 BPM | SYSTOLIC BLOOD PRESSURE: 152 MMHG | OXYGEN SATURATION: 97 % | HEIGHT: 72 IN | BODY MASS INDEX: 34.67 KG/M2

## 2024-09-24 DIAGNOSIS — Z12.5 SCREENING FOR PROSTATE CANCER: ICD-10-CM

## 2024-09-24 DIAGNOSIS — N52.9 ERECTILE DYSFUNCTION, UNSPECIFIED ERECTILE DYSFUNCTION TYPE: ICD-10-CM

## 2024-09-24 DIAGNOSIS — N40.1 BPH WITH OBSTRUCTION/LOWER URINARY TRACT SYMPTOMS: Primary | ICD-10-CM

## 2024-09-24 DIAGNOSIS — N13.8 BPH WITH OBSTRUCTION/LOWER URINARY TRACT SYMPTOMS: Primary | ICD-10-CM

## 2024-09-24 PROCEDURE — 99213 OFFICE O/P EST LOW 20 MIN: CPT

## 2024-09-24 RX ORDER — TADALAFIL 5 MG/1
5 TABLET ORAL DAILY PRN
Qty: 15 TABLET | Refills: 0 | Status: SHIPPED | OUTPATIENT
Start: 2024-09-24

## 2024-09-24 RX ORDER — TAMSULOSIN HYDROCHLORIDE 0.4 MG/1
0.4 CAPSULE ORAL
Qty: 90 CAPSULE | Refills: 1 | Status: SHIPPED | OUTPATIENT
Start: 2024-09-24

## 2024-09-30 ENCOUNTER — TELEPHONE (OUTPATIENT)
Dept: OTOLARYNGOLOGY | Facility: CLINIC | Age: 79
End: 2024-09-30

## 2024-09-30 DIAGNOSIS — J32.9 CHRONIC RHINOSINUSITIS: Primary | ICD-10-CM

## 2024-09-30 DIAGNOSIS — J33.9 NOSE POLYP: ICD-10-CM

## 2024-09-30 RX ORDER — FLUTICASONE PROPIONATE 50 MCG
1 SPRAY, SUSPENSION (ML) NASAL 2 TIMES DAILY
Qty: 10 ML | Refills: 3 | Status: SHIPPED | OUTPATIENT
Start: 2024-09-30

## 2024-12-31 ENCOUNTER — OFFICE VISIT (OUTPATIENT)
Dept: UROLOGY | Facility: CLINIC | Age: 79
End: 2024-12-31
Payer: COMMERCIAL

## 2024-12-31 VITALS
OXYGEN SATURATION: 98 % | DIASTOLIC BLOOD PRESSURE: 80 MMHG | BODY MASS INDEX: 35.87 KG/M2 | WEIGHT: 264.8 LBS | HEART RATE: 68 BPM | SYSTOLIC BLOOD PRESSURE: 136 MMHG | HEIGHT: 72 IN

## 2024-12-31 DIAGNOSIS — N52.9 ERECTILE DYSFUNCTION, UNSPECIFIED ERECTILE DYSFUNCTION TYPE: ICD-10-CM

## 2024-12-31 DIAGNOSIS — N40.1 BPH WITH OBSTRUCTION/LOWER URINARY TRACT SYMPTOMS: Primary | ICD-10-CM

## 2024-12-31 DIAGNOSIS — N13.8 BPH WITH OBSTRUCTION/LOWER URINARY TRACT SYMPTOMS: Primary | ICD-10-CM

## 2024-12-31 LAB — POST-VOID RESIDUAL VOLUME, ML POC: 61 ML

## 2024-12-31 PROCEDURE — 99213 OFFICE O/P EST LOW 20 MIN: CPT

## 2024-12-31 PROCEDURE — 51798 US URINE CAPACITY MEASURE: CPT

## 2024-12-31 RX ORDER — SILDENAFIL 25 MG/1
25 TABLET, FILM COATED ORAL DAILY PRN
Qty: 10 TABLET | Refills: 1 | Status: SHIPPED | OUTPATIENT
Start: 2024-12-31

## 2024-12-31 RX ORDER — CHLORAL HYDRATE 500 MG
1 CAPSULE ORAL EVERY EVENING
COMMUNITY

## 2024-12-31 NOTE — PROGRESS NOTES
12/31/2024      No chief complaint on file.        Assessment and Plan    79 y.o. male       BPH with lower urinary tract symptoms  -Continue tamsulosin 0.4 mg. Patient tolerating medication well.   -PVR 61 mls today.      2. Prostate Cancer Screening  -Previous PSA 3.03  (12/10/24).      3. Erectile Dysfunction  -Previously tried max dose of Cialis on-demand without results. Interested in trying Viagra. We also discussed intracavernosal injections and IPP which patient defers at this time.      -Patient will return in 4 months for follow-up.  -All questions addressed.           History of Present Illness  Vadim Madrigal is a 79 y.o. male  here with history of BPH, ED  and abnormal MIKE presenting today for follow-up. He had a prostate biopsy in 2012 which was benign. He also has hx of TURP in 2014 was also benign. Most recent PSA is 3.03. He denies family hx of prostate ca.  He is seeing a neurosurgeon regarding lumbar stenosis.    He states that he still does have a weaker stream at times as well as urinary urgency and frequency. He has been on 0.4 mg daily flomax, unsure if this is helping but tolerating medication well and would like to continue this.           Review of Systems   Constitutional:  Negative for activity change, chills, fatigue and fever.   HENT:  Negative for congestion, rhinorrhea and sore throat.    Eyes:  Negative for photophobia, redness and visual disturbance.   Respiratory:  Negative for cough, shortness of breath and wheezing.    Cardiovascular:  Negative for chest pain, palpitations and leg swelling.   Gastrointestinal:  Negative for abdominal pain, constipation, diarrhea, nausea and vomiting.   Genitourinary:  Positive for frequency and urgency. Negative for difficulty urinating, dysuria, flank pain and hematuria.   Neurological:  Negative for weakness, light-headedness and headaches.           AUA SYMPTOM SCORE      Flowsheet Row Most Recent Value   AUA SYMPTOM SCORE    How often  have you had a sensation of not emptying your bladder completely after you finished urinating? 3 (P)     How often have you had to urinate again less than two hours after you finished urinating? 3 (P)     How often have you found you stopped and started again several times when you urinate? 2 (P)     How often have you found it difficult to postpone urination? 2 (P)     How often have you had a weak urinary stream? 2 (P)     How often have you had to push or strain to begin urination? 2 (P)     How many times did you most typically get up to urinate from the time you went to bed at night until the time you got up in the morning? 5 (P)     Quality of Life: If you were to spend the rest of your life with your urinary condition just the way it is now, how would you feel about that? 2 (P)     AUA SYMPTOM SCORE 19 (P)               Vitals  Vitals:    12/31/24 1347   BP: 136/80   BP Location: Left arm   Patient Position: Sitting   Cuff Size: Large   Pulse: 68   SpO2: 98%   Weight: 120 kg (264 lb 12.8 oz)   Height: 6' (1.829 m)       Physical Exam  Constitutional:       Appearance: Normal appearance. He is not toxic-appearing.   HENT:      Head: Normocephalic.      Mouth/Throat:      Pharynx: Oropharynx is clear.   Eyes:      Extraocular Movements: Extraocular movements intact.      Pupils: Pupils are equal, round, and reactive to light.   Pulmonary:      Effort: Pulmonary effort is normal. No respiratory distress.   Musculoskeletal:         General: Normal range of motion.      Cervical back: Normal range of motion.   Neurological:      Mental Status: He is alert and oriented to person, place, and time. Mental status is at baseline.      Gait: Gait normal.   Psychiatric:         Mood and Affect: Mood normal.         Behavior: Behavior normal.         Thought Content: Thought content normal.         Judgment: Judgment normal.           Past History  Past Medical History:   Diagnosis Date    Arthritis     Hypertension      Sexual dysfunction      Social History     Socioeconomic History    Marital status: /Civil Union     Spouse name: None    Number of children: None    Years of education: None    Highest education level: None   Occupational History    None   Tobacco Use    Smoking status: Former    Smokeless tobacco: Never   Vaping Use    Vaping status: Never Used   Substance and Sexual Activity    Alcohol use: Yes     Comment: social    Drug use: No    Sexual activity: None   Other Topics Concern    None   Social History Narrative    None     Social Drivers of Health     Financial Resource Strain: Low Risk  (10/9/2023)    [facility]    Overall Financial Resource Strain (CARDIA)     Difficulty of Paying Living Expenses: Not very hard   Food Insecurity: No Food Insecurity (10/9/2023)    [facility]    Hunger Vital Sign     Worried About Running Out of Food in the Last Year: Never true     Ran Out of Food in the Last Year: Never true   Transportation Needs: No Transportation Needs (10/9/2023)    [facility]    PRAPARE - Transportation     Lack of Transportation (Medical): No     Lack of Transportation (Non-Medical): No   Physical Activity: Not on file   Stress: Not on file   Social Connections: Not on file   Intimate Partner Violence: Not At Risk (10/9/2023)    [facility] from [facility]    Humiliation, Afraid, Rape, and Kick questionnaire     Fear of Current or Ex-Partner: No     Emotionally Abused: No     Physically Abused: No     Sexually Abused: No   Housing Stability: Low Risk  (10/9/2023)    [facility]    Housing Stability Vital Sign     Unable to Pay for Housing in the Last Year: No     Number of Places Lived in the Last Year: 1      "Unstable Housing in the Last Year: No     Social History     Tobacco Use   Smoking Status Former   Smokeless Tobacco Never     Family History   Problem Relation Age of Onset    Diabetes Mother     Heart disease Mother     Hypertension Mother     Heart disease Father     Hypertension Father     Stroke Father        The following portions of the patient's history were reviewed and updated as appropriate: allergies, current medications, past medical history, past social history, past surgical history and problem list.    Results  Recent Results (from the past hour)   POCT Measure PVR    Collection Time: 12/31/24  1:46 PM   Result Value Ref Range    POST-VOID RESIDUAL VOLUME, ML POC 61 mL   ]  No results found for: \"PSA\"  Lab Results   Component Value Date    GLUCOSE 129 12/12/2014    CALCIUM 9.2 06/18/2024     12/12/2014    K 4.5 06/18/2024    CO2 27 06/18/2024     06/18/2024    BUN 16 06/18/2024    CREATININE 0.84 06/18/2024     Lab Results   Component Value Date    WBC 10.86 (H) 01/17/2017    HGB 13.4 10/06/2020    HCT 40.1 10/06/2020    MCV 91 01/17/2017     01/17/2017       RADHA Churchill  "

## 2025-05-01 ENCOUNTER — OFFICE VISIT (OUTPATIENT)
Dept: UROLOGY | Facility: CLINIC | Age: 80
End: 2025-05-01
Payer: COMMERCIAL

## 2025-05-01 ENCOUNTER — TELEPHONE (OUTPATIENT)
Age: 80
End: 2025-05-01

## 2025-05-01 VITALS
BODY MASS INDEX: 35.03 KG/M2 | DIASTOLIC BLOOD PRESSURE: 84 MMHG | SYSTOLIC BLOOD PRESSURE: 136 MMHG | HEIGHT: 72 IN | HEART RATE: 69 BPM | WEIGHT: 258.6 LBS | OXYGEN SATURATION: 98 %

## 2025-05-01 DIAGNOSIS — R35.0 BENIGN PROSTATIC HYPERPLASIA WITH URINARY FREQUENCY: ICD-10-CM

## 2025-05-01 DIAGNOSIS — Z12.5 SCREENING FOR PROSTATE CANCER: ICD-10-CM

## 2025-05-01 DIAGNOSIS — N40.1 BENIGN PROSTATIC HYPERPLASIA WITH URINARY FREQUENCY: ICD-10-CM

## 2025-05-01 DIAGNOSIS — N52.9 ERECTILE DYSFUNCTION, UNSPECIFIED ERECTILE DYSFUNCTION TYPE: ICD-10-CM

## 2025-05-01 DIAGNOSIS — Z87.898 HISTORY OF ELEVATED PSA: Primary | ICD-10-CM

## 2025-05-01 PROCEDURE — 99213 OFFICE O/P EST LOW 20 MIN: CPT

## 2025-05-01 NOTE — TELEPHONE ENCOUNTER
Please send PSA script to patients home address below.       ADDRESS:      [address] [id number]

## 2025-05-01 NOTE — PROGRESS NOTES
Name: Vadim Madrigal      : 1945      MRN: [id number]  Encounter Provider: RADHA Churchill  Encounter Date: 2025   Encounter department: [facility]  :  Assessment & Plan  History of elevated PSA  - Previous history of abnormal MIKE.  Prostate biopsy in 2012 benign.  TURP procedure in , pathology also benign.  His last PSA was 3.03 completed on 12-10-24.  We discussed that he can repeat PSA for 2025, if PSA is still stable, can discontinue screening.       Benign prostatic hyperplasia with urinary frequency  - Continue 0.4 mg daily tamsulosin.  Patient content with medication and tolerating well.  -We discussed that I do suspect his increased urinary frequency in the morning is associated with a large amount of caffeine intake.  Patient states he drinks 2 (20) ounce cups of coffee per day.       Erectile dysfunction, unspecified erectile dysfunction type  - Previously has utilized Cialis and sildenafil without results.  He states he got a nosebleed from sildenafil.  We had previously discussed intracavernosal injections and IPP.  We rediscussed this today.  Patient defers.      - Patient will return in 7 months for follow-up, with PSA prior to visit.  All questions addressed.  Please do not hesitate to reach out with any further questions or concerns.           History of Present Illness   Vadim Madrigal is a 79 y.o. male who presents here with history of BPH, ED  and abnormal MIKE presenting today for follow-up.  Patient was last seen by me in 2024.  He had a prostate biopsy in  which was benign. He also has hx of TURP in 2014 was also benign. Most recent PSA is 3.03. He denies family hx of prostate ca.    He has been on 0.4 mg daily flomax, unsure if this is helping but tolerating medication well and would like to continue this. Patient states is doing well. Goes to the bathroom more frequently in AM.  He states that he drinks 2 (20oz  cups) coffee per day.  Occasional nocturia, not all the time.  Asymptomatic of dysuria, flank pain, gross hematuria.  Denies history of recurrent UTIs.        AUA SYMPTOM SCORE      Flowsheet Row Most Recent Value   AUA SYMPTOM SCORE    How often have you had a sensation of not emptying your bladder completely after you finished urinating? 1 (P)     How often have you had to urinate again less than two hours after you finished urinating? 2 (P)     How often have you found you stopped and started again several times when you urinate? 2 (P)     How often have you found it difficult to postpone urination? 2 (P)     How often have you had a weak urinary stream? 2 (P)     How often have you had to push or strain to begin urination? 2 (P)     How many times did you most typically get up to urinate from the time you went to bed at night until the time you got up in the morning? 1 (P)     Quality of Life: If you were to spend the rest of your life with your urinary condition just the way it is now, how would you feel about that? 2 (P)     AUA SYMPTOM SCORE 12 (P)            Review of Systems   Constitutional:  Negative for activity change, chills, fatigue and fever.   HENT:  Negative for congestion, rhinorrhea and sore throat.    Eyes:  Negative for photophobia, redness and visual disturbance.   Respiratory:  Negative for cough, shortness of breath and wheezing.    Cardiovascular:  Negative for chest pain, palpitations and leg swelling.   Gastrointestinal:  Negative for abdominal pain, diarrhea, nausea and vomiting.   Genitourinary:  Positive for frequency. Negative for difficulty urinating, dysuria, flank pain, hematuria and urgency.   Neurological:  Negative for weakness, light-headedness and headaches.          Objective   /84 (BP Location: Left arm, Patient Position: Sitting, Cuff Size: Adult)   Pulse 69   Ht 6' (1.829 m)   Wt 117 kg (258 lb 9.6 oz)   SpO2 98%   BMI 35.07 kg/m²     Physical Exam  Vitals  "reviewed.   Constitutional:       General: He is not in acute distress.     Appearance: He is well-developed.   HENT:      Head: Normocephalic and atraumatic.   Eyes:      Conjunctiva/sclera: Conjunctivae normal.   Pulmonary:      Effort: Pulmonary effort is normal. No respiratory distress.   Neurological:      Mental Status: He is alert.   Psychiatric:         Mood and Affect: Mood normal.          Results   No results found for: \"PSA\"  Lab Results   Component Value Date    GLUCOSE 129 12/12/2014    CALCIUM 9.2 06/18/2024     12/12/2014    K 4.5 06/18/2024    CO2 27 06/18/2024     06/18/2024    BUN 16 06/18/2024    CREATININE 0.84 06/18/2024     Lab Results   Component Value Date    WBC 10.86 (H) 01/17/2017    HGB 13.4 10/06/2020    HCT 40.1 10/06/2020    MCV 91 01/17/2017     01/17/2017       Office Urine Dip  No results found for this or any previous visit (from the past hour).      " ACP